# Patient Record
Sex: MALE | Race: BLACK OR AFRICAN AMERICAN | NOT HISPANIC OR LATINO | URBAN - METROPOLITAN AREA
[De-identification: names, ages, dates, MRNs, and addresses within clinical notes are randomized per-mention and may not be internally consistent; named-entity substitution may affect disease eponyms.]

---

## 2020-12-19 ENCOUNTER — EMERGENCY (EMERGENCY)
Facility: HOSPITAL | Age: 60
LOS: 1 days | Discharge: HOME | End: 2020-12-19
Admitting: EMERGENCY MEDICINE
Payer: COMMERCIAL

## 2020-12-19 VITALS
RESPIRATION RATE: 18 BRPM | OXYGEN SATURATION: 98 % | DIASTOLIC BLOOD PRESSURE: 82 MMHG | HEIGHT: 71 IN | HEART RATE: 81 BPM | TEMPERATURE: 99 F | SYSTOLIC BLOOD PRESSURE: 129 MMHG | WEIGHT: 182.98 LBS

## 2020-12-19 VITALS
DIASTOLIC BLOOD PRESSURE: 72 MMHG | HEART RATE: 76 BPM | TEMPERATURE: 98 F | OXYGEN SATURATION: 98 % | SYSTOLIC BLOOD PRESSURE: 134 MMHG | RESPIRATION RATE: 16 BRPM

## 2020-12-19 DIAGNOSIS — M79.604 PAIN IN RIGHT LEG: ICD-10-CM

## 2020-12-19 LAB
ALBUMIN SERPL ELPH-MCNC: 3.9 G/DL — SIGNIFICANT CHANGE UP (ref 3.4–5)
ALP SERPL-CCNC: 56 U/L — SIGNIFICANT CHANGE UP (ref 40–120)
ALT FLD-CCNC: 23 U/L — SIGNIFICANT CHANGE UP (ref 12–42)
ANION GAP SERPL CALC-SCNC: 8 MMOL/L — LOW (ref 9–16)
APTT BLD: 30.5 SEC — SIGNIFICANT CHANGE UP (ref 27.5–35.5)
AST SERPL-CCNC: 35 U/L — SIGNIFICANT CHANGE UP (ref 15–37)
BASOPHILS # BLD AUTO: 0.05 K/UL — SIGNIFICANT CHANGE UP (ref 0–0.2)
BASOPHILS NFR BLD AUTO: 1.1 % — SIGNIFICANT CHANGE UP (ref 0–2)
BILIRUB SERPL-MCNC: 0.6 MG/DL — SIGNIFICANT CHANGE UP (ref 0.2–1.2)
BUN SERPL-MCNC: 15 MG/DL — SIGNIFICANT CHANGE UP (ref 7–23)
CALCIUM SERPL-MCNC: 9.7 MG/DL — SIGNIFICANT CHANGE UP (ref 8.5–10.5)
CHLORIDE SERPL-SCNC: 106 MMOL/L — SIGNIFICANT CHANGE UP (ref 96–108)
CK SERPL-CCNC: 166 U/L — SIGNIFICANT CHANGE UP (ref 39–308)
CO2 SERPL-SCNC: 27 MMOL/L — SIGNIFICANT CHANGE UP (ref 22–31)
CREAT SERPL-MCNC: 1.14 MG/DL — SIGNIFICANT CHANGE UP (ref 0.5–1.3)
D DIMER BLD IA.RAPID-MCNC: 284 NG/ML DDU — HIGH
EOSINOPHIL # BLD AUTO: 0.43 K/UL — SIGNIFICANT CHANGE UP (ref 0–0.5)
EOSINOPHIL NFR BLD AUTO: 9.2 % — HIGH (ref 0–6)
GLUCOSE SERPL-MCNC: 114 MG/DL — HIGH (ref 70–99)
HCT VFR BLD CALC: 41.2 % — SIGNIFICANT CHANGE UP (ref 39–50)
HGB BLD-MCNC: 14.2 G/DL — SIGNIFICANT CHANGE UP (ref 13–17)
IMM GRANULOCYTES NFR BLD AUTO: 0.2 % — SIGNIFICANT CHANGE UP (ref 0–1.5)
INR BLD: 1.13 — SIGNIFICANT CHANGE UP (ref 0.88–1.16)
LYMPHOCYTES # BLD AUTO: 1.43 K/UL — SIGNIFICANT CHANGE UP (ref 1–3.3)
LYMPHOCYTES # BLD AUTO: 30.5 % — SIGNIFICANT CHANGE UP (ref 13–44)
MAGNESIUM SERPL-MCNC: 2 MG/DL — SIGNIFICANT CHANGE UP (ref 1.6–2.6)
MCHC RBC-ENTMCNC: 25.9 PG — LOW (ref 27–34)
MCHC RBC-ENTMCNC: 34.5 GM/DL — SIGNIFICANT CHANGE UP (ref 32–36)
MCV RBC AUTO: 75 FL — LOW (ref 80–100)
MONOCYTES # BLD AUTO: 0.5 K/UL — SIGNIFICANT CHANGE UP (ref 0–0.9)
MONOCYTES NFR BLD AUTO: 10.7 % — SIGNIFICANT CHANGE UP (ref 2–14)
NEUTROPHILS # BLD AUTO: 2.27 K/UL — SIGNIFICANT CHANGE UP (ref 1.8–7.4)
NEUTROPHILS NFR BLD AUTO: 48.3 % — SIGNIFICANT CHANGE UP (ref 43–77)
NRBC # BLD: 0 /100 WBCS — SIGNIFICANT CHANGE UP (ref 0–0)
PLATELET # BLD AUTO: 228 K/UL — SIGNIFICANT CHANGE UP (ref 150–400)
POTASSIUM SERPL-MCNC: 4.4 MMOL/L — SIGNIFICANT CHANGE UP (ref 3.5–5.3)
POTASSIUM SERPL-SCNC: 4.4 MMOL/L — SIGNIFICANT CHANGE UP (ref 3.5–5.3)
PROT SERPL-MCNC: 8.5 G/DL — HIGH (ref 6.4–8.2)
PROTHROM AB SERPL-ACNC: 13.3 SEC — SIGNIFICANT CHANGE UP (ref 10.6–13.6)
RBC # BLD: 5.49 M/UL — SIGNIFICANT CHANGE UP (ref 4.2–5.8)
RBC # FLD: 16.1 % — HIGH (ref 10.3–14.5)
SODIUM SERPL-SCNC: 141 MMOL/L — SIGNIFICANT CHANGE UP (ref 132–145)
WBC # BLD: 4.69 K/UL — SIGNIFICANT CHANGE UP (ref 3.8–10.5)
WBC # FLD AUTO: 4.69 K/UL — SIGNIFICANT CHANGE UP (ref 3.8–10.5)

## 2020-12-19 PROCEDURE — 73590 X-RAY EXAM OF LOWER LEG: CPT | Mod: 26,RT

## 2020-12-19 PROCEDURE — 93971 EXTREMITY STUDY: CPT | Mod: 26,RT

## 2020-12-19 PROCEDURE — 99053 MED SERV 10PM-8AM 24 HR FAC: CPT

## 2020-12-19 PROCEDURE — 99285 EMERGENCY DEPT VISIT HI MDM: CPT | Mod: 25

## 2020-12-19 RX ORDER — CYCLOBENZAPRINE HYDROCHLORIDE 10 MG/1
10 TABLET, FILM COATED ORAL ONCE
Refills: 0 | Status: COMPLETED | OUTPATIENT
Start: 2020-12-19 | End: 2020-12-19

## 2020-12-19 RX ORDER — TRAMADOL HYDROCHLORIDE 50 MG/1
1 TABLET ORAL
Qty: 5 | Refills: 0
Start: 2020-12-19

## 2020-12-19 RX ORDER — ACETAMINOPHEN WITH CODEINE 300MG-30MG
1 TABLET ORAL ONCE
Refills: 0 | Status: DISCONTINUED | OUTPATIENT
Start: 2020-12-19 | End: 2020-12-19

## 2020-12-19 RX ORDER — KETOROLAC TROMETHAMINE 30 MG/ML
15 SYRINGE (ML) INJECTION ONCE
Refills: 0 | Status: DISCONTINUED | OUTPATIENT
Start: 2020-12-19 | End: 2020-12-19

## 2020-12-19 RX ORDER — TRAMADOL HYDROCHLORIDE 50 MG/1
50 TABLET ORAL ONCE
Refills: 0 | Status: DISCONTINUED | OUTPATIENT
Start: 2020-12-19 | End: 2020-12-19

## 2020-12-19 RX ORDER — CYCLOBENZAPRINE HYDROCHLORIDE 10 MG/1
1 TABLET, FILM COATED ORAL
Qty: 15 | Refills: 0
Start: 2020-12-19 | End: 2020-12-23

## 2020-12-19 RX ADMIN — CYCLOBENZAPRINE HYDROCHLORIDE 10 MILLIGRAM(S): 10 TABLET, FILM COATED ORAL at 06:32

## 2020-12-19 RX ADMIN — Medication 1 TABLET(S): at 06:32

## 2020-12-19 RX ADMIN — TRAMADOL HYDROCHLORIDE 50 MILLIGRAM(S): 50 TABLET ORAL at 11:23

## 2020-12-19 RX ADMIN — Medication 15 MILLIGRAM(S): at 06:18

## 2020-12-19 NOTE — ED PROVIDER NOTE - PROGRESS NOTE DETAILS
SOCORRO Farah signed out patient to day shift pending US, re-evaluation and dispositions. labs reviewed. d-dimer age corrected normal. xray with no acute findings. reports improved pain after medication

## 2020-12-19 NOTE — ED PROVIDER NOTE - PHYSICAL EXAMINATION
VITAL SIGNS: I have reviewed nursing notes and confirm.  CONSTITUTIONAL: Well-developed; well-nourished; in no acute distress.  SKIN: Skin is warm and dry, no acute rash.  HEAD: Normocephalic; atraumatic.  EYES: PERRL, EOM intact; conjunctiva and sclera clear.  ENT: No nasal discharge; airway clear.  NECK: Supple; non tender.  CARD: S1, S2 normal; no murmurs, gallops, or rubs. Regular rate and rhythm.  RESP: No wheezes, rales or rhonchi.  ABD: Normal bowel sounds; soft; non-distended; non-tender;  no palpable or pulsating mass; no hepatosplenomegaly.  EXT: RLE: pinpoint TTP of distal tibia, no bruising/swelling/erythema seen, NVI, strength 5/5, no calf tenderness  OTHER: Normal ROM. No clubbing, cyanosis or edema.  NEURO: Alert, oriented. Grossly unremarkable.  PSYCH: Cooperative, appropriate.

## 2020-12-19 NOTE — ED PROVIDER NOTE - PROVIDER TOKENS
PROVIDER:[TOKEN:[[id number]],FOLLOWUP:[1-3 Days]],PROVIDER:[TOKEN:[[id number]],FOLLOWUP:[1-3 Days]]

## 2020-12-19 NOTE — ED PROVIDER NOTE - OBJECTIVE STATEMENT
60 year old male with h/o HIV (on meds, last VL undetectable in Oct) presents with pain to anterior right leg x 4 days. reports pain has been constant. no known injury or trauma. reports lives at home. has been trying to elevate and take motrin with minimal relief. states it feels tight. no recent travel  Denies head trauma, LOC, break in the skin, paresthesia, numbness, tingling, redness, bleeding, d/c, HA, dizziness, SOB, CP, palpitations, N/V, focal weakness, neck/back pain, and malaise.

## 2020-12-19 NOTE — ED PROVIDER NOTE - CARE PROVIDER_API CALL
Mireille Barreto ([place] [address]  Phone: [phone]  Fax: [phone]  Follow Up Time: 1-3 Days    Jose [place] [address]  Phone: [phone]  Fax: [phone]  Follow Up Time: 1-3 Days

## 2020-12-19 NOTE — ED PROVIDER NOTE - NSFOLLOWUPINSTRUCTIONS_ED_ALL_ED_FT
PLEASE FOLLOW-UP WITH YOUR PRIMARY CARE DOCTOR IN 1-3 DAYS FOR FURTHER EVALUATION.  PLEASE TAKE ALL PAPERWORK FROM TODAY'S VISIT TO YOUR PRIMARY DOCTOR.  IF YOU DO NOT HAVE A PRIMARY CARE DOCTOR PLEASE REFER TO ONE OF THE PRIMARY CARE DOCTORS' INFORMATION GIVEN ABOVE.  YOU MAY ALSO CALL [phone] AND ASK FOR MS. MAYCOL LUCIANO.  SHE CAN HELP YOU MAKE A FOLLOW-UP APPOINTMENT.  HER HOURS ARE 11AM-7PM MONDAY - FRIDAY.    PLEASE RETURN TO THE ER IMMEDIATELY OR CALL [phone] FOR ANY HIGH FEVER, CHEST PAIN, TROUBLE BREATHING, VOMITING, SEVERE PAIN, OR ANY OTHER CONCERNS

## 2020-12-19 NOTE — ED ADULT NURSE REASSESSMENT NOTE - NS ED NURSE REASSESS COMMENT FT1
Pt received from AZUCENA Diallo. Pt C.o 10/10 pain in right lower leg and requesting food. SOCORRO Goncalves notified, warm packs and food given to patient at this time. Pt pending US. Will continue to monitor.

## 2020-12-19 NOTE — ED ADULT NURSE NOTE - CHIEF COMPLAINT QUOTE
pt on disability, lives at home, 5 days ago had spontaneous right lower leg pain, denies injury, tried to elevate and use motrin to no relief, pt states "it feels tight", nil sob

## 2020-12-19 NOTE — ED ADULT NURSE NOTE - OBJECTIVE STATEMENT
Pt to ER c/o Rt lower leg pain x 5 days, describe as tightness, denies injury or trauma, no labored breathing, tried motrin and tylenol at home with no relief

## 2020-12-19 NOTE — ED PROVIDER NOTE - NS ED ROS FT
· CONSTITUTIONAL: no fever and no chills.  · CARDIOVASCULAR: normal rate and rhythm, no chest pain and no edema.  · RESPIRATORY: no chest pain, no cough, and no shortness of breath.  · GASTROINTESTINAL: no abdominal pain, no bloating, no constipation, no diarrhea, no nausea and no vomiting.  · MUSCULOSKELETAL: no back pain, +leg pain, no musculoskeletal pain, no neck pain, and no weakness.  · SKIN: no abrasions, no jaundice, no lesions, no pruritis, and no rashes.  · NEURO: no loss of consciousness, no gait abnormality, no headache, no sensory deficits, and no weakness.  · PSYCHIATRIC: no known mental health issues.

## 2020-12-19 NOTE — ED PROVIDER NOTE - CARE PROVIDERS DIRECT ADDRESSES
[email or web address]

## 2020-12-19 NOTE — ED PROVIDER NOTE - PATIENT PORTAL LINK FT
You can access the FollowMyHealth Patient Portal offered by [facility] by registering at the following website: [email or web address]. By joining [facility]’s FollowMyHealth portal, you will also be able to view your health information using other applications (apps) compatible with our system.

## 2021-04-12 ENCOUNTER — HOSPITAL ENCOUNTER (INPATIENT)
Dept: HOSPITAL 74 - YASAS | Age: 61
LOS: 14 days | Discharge: HOME | DRG: 772 | End: 2021-04-26
Attending: ALLERGY & IMMUNOLOGY | Admitting: ALLERGY & IMMUNOLOGY
Payer: COMMERCIAL

## 2021-04-12 VITALS — BODY MASS INDEX: 24.5 KG/M2

## 2021-04-12 DIAGNOSIS — Z91.5: ICD-10-CM

## 2021-04-12 DIAGNOSIS — F17.210: ICD-10-CM

## 2021-04-12 DIAGNOSIS — F14.20: Primary | ICD-10-CM

## 2021-04-12 DIAGNOSIS — I10: ICD-10-CM

## 2021-04-12 DIAGNOSIS — F10.20: ICD-10-CM

## 2021-04-12 DIAGNOSIS — F31.9: ICD-10-CM

## 2021-04-12 PROCEDURE — U0003 INFECTIOUS AGENT DETECTION BY NUCLEIC ACID (DNA OR RNA); SEVERE ACUTE RESPIRATORY SYNDROME CORONAVIRUS 2 (SARS-COV-2) (CORONAVIRUS DISEASE [COVID-19]), AMPLIFIED PROBE TECHNIQUE, MAKING USE OF HIGH THROUGHPUT TECHNOLOGIES AS DESCRIBED BY CMS-2020-01-R: HCPCS

## 2021-04-12 PROCEDURE — G0009 ADMIN PNEUMOCOCCAL VACCINE: HCPCS

## 2021-04-12 PROCEDURE — G0008 ADMIN INFLUENZA VIRUS VAC: HCPCS

## 2021-04-12 PROCEDURE — U0005 INFEC AGEN DETEC AMPLI PROBE: HCPCS

## 2021-04-12 PROCEDURE — C9803 HOPD COVID-19 SPEC COLLECT: HCPCS

## 2021-04-13 LAB
ALBUMIN SERPL-MCNC: 3.2 G/DL (ref 3.4–5)
ALP SERPL-CCNC: 56 U/L (ref 45–117)
ALT SERPL-CCNC: 24 U/L (ref 13–61)
ANION GAP SERPL CALC-SCNC: 3 MMOL/L (ref 8–16)
APPEARANCE UR: CLEAR
AST SERPL-CCNC: 40 U/L (ref 15–37)
BILIRUB SERPL-MCNC: 0.4 MG/DL (ref 0.2–1)
BILIRUB UR STRIP.AUTO-MCNC: NEGATIVE MG/DL
BUN SERPL-MCNC: 12.6 MG/DL (ref 7–18)
CALCIUM SERPL-MCNC: 9.5 MG/DL (ref 8.5–10.1)
CHLORIDE SERPL-SCNC: 108 MMOL/L (ref 98–107)
CO2 SERPL-SCNC: 29 MMOL/L (ref 21–32)
COLOR UR: YELLOW
CREAT SERPL-MCNC: 1.1 MG/DL (ref 0.55–1.3)
DEPRECATED RDW RBC AUTO: 14.4 % (ref 11.9–15.9)
GLUCOSE SERPL-MCNC: 77 MG/DL (ref 74–106)
HCT VFR BLD CALC: 42.2 % (ref 35.4–49)
HGB BLD-MCNC: 14.3 GM/DL (ref 11.7–16.9)
KETONES UR QL STRIP: NEGATIVE
LEUKOCYTE ESTERASE UR QL STRIP.AUTO: NEGATIVE
MCH RBC QN AUTO: 26.6 PG (ref 25.7–33.7)
MCHC RBC AUTO-ENTMCNC: 33.8 G/DL (ref 32–35.9)
MCV RBC: 78.7 FL (ref 80–96)
NITRITE UR QL STRIP: NEGATIVE
PH UR: 5.5 [PH] (ref 5–8)
PLATELET # BLD AUTO: 205 K/MM3 (ref 134–434)
PMV BLD: 9.1 FL (ref 7.5–11.1)
PROT SERPL-MCNC: 7.3 G/DL (ref 6.4–8.2)
PROT UR QL STRIP: NEGATIVE
PROT UR QL STRIP: NEGATIVE
RBC # BLD AUTO: 5.36 M/MM3 (ref 4–5.6)
SODIUM SERPL-SCNC: 140 MMOL/L (ref 136–145)
SP GR UR: 1.01 (ref 1.01–1.03)
UROBILINOGEN UR STRIP-MCNC: 0.2 MG/DL (ref 0.2–1)
WBC # BLD AUTO: 3.7 K/MM3 (ref 4–10)

## 2021-04-13 PROCEDURE — HZ42ZZZ GROUP COUNSELING FOR SUBSTANCE ABUSE TREATMENT, COGNITIVE-BEHAVIORAL: ICD-10-PCS | Performed by: ALLERGY & IMMUNOLOGY

## 2021-04-13 RX ADMIN — NICOTINE SCH MG: 7 PATCH TRANSDERMAL at 10:06

## 2021-04-13 RX ADMIN — Medication SCH MG: at 01:36

## 2021-04-13 RX ADMIN — Medication SCH MG: at 21:06

## 2021-04-13 RX ADMIN — TRAZODONE HYDROCHLORIDE SCH MG: 100 TABLET ORAL at 21:06

## 2021-04-13 RX ADMIN — LISINOPRIL SCH MG: 20 TABLET ORAL at 10:06

## 2021-04-13 RX ADMIN — Medication SCH TAB: at 10:06

## 2021-04-14 RX ADMIN — Medication SCH TAB: at 09:33

## 2021-04-14 RX ADMIN — TRAZODONE HYDROCHLORIDE SCH MG: 100 TABLET ORAL at 21:14

## 2021-04-14 RX ADMIN — Medication SCH MG: at 21:14

## 2021-04-14 RX ADMIN — LISINOPRIL SCH MG: 20 TABLET ORAL at 09:33

## 2021-04-14 RX ADMIN — NICOTINE SCH MG: 7 PATCH TRANSDERMAL at 09:33

## 2021-04-15 RX ADMIN — Medication SCH MG: at 21:09

## 2021-04-15 RX ADMIN — NICOTINE SCH MG: 7 PATCH TRANSDERMAL at 09:38

## 2021-04-15 RX ADMIN — LISINOPRIL SCH MG: 20 TABLET ORAL at 09:39

## 2021-04-15 RX ADMIN — TRAZODONE HYDROCHLORIDE SCH MG: 100 TABLET ORAL at 21:09

## 2021-04-15 RX ADMIN — Medication SCH TAB: at 09:39

## 2021-04-16 RX ADMIN — Medication SCH MG: at 21:03

## 2021-04-16 RX ADMIN — Medication SCH TAB: at 09:43

## 2021-04-16 RX ADMIN — TRAZODONE HYDROCHLORIDE SCH MG: 100 TABLET ORAL at 21:03

## 2021-04-16 RX ADMIN — NICOTINE SCH MG: 7 PATCH TRANSDERMAL at 09:43

## 2021-04-16 RX ADMIN — LISINOPRIL SCH MG: 20 TABLET ORAL at 09:43

## 2021-04-17 RX ADMIN — LISINOPRIL SCH MG: 20 TABLET ORAL at 09:22

## 2021-04-17 RX ADMIN — Medication SCH MG: at 21:07

## 2021-04-17 RX ADMIN — TRAZODONE HYDROCHLORIDE SCH MG: 100 TABLET ORAL at 21:07

## 2021-04-17 RX ADMIN — Medication SCH TAB: at 09:21

## 2021-04-17 RX ADMIN — NICOTINE SCH: 7 PATCH TRANSDERMAL at 09:21

## 2021-04-18 RX ADMIN — LISINOPRIL SCH MG: 20 TABLET ORAL at 09:14

## 2021-04-18 RX ADMIN — Medication SCH MG: at 21:09

## 2021-04-18 RX ADMIN — NICOTINE SCH: 7 PATCH TRANSDERMAL at 09:14

## 2021-04-18 RX ADMIN — TRAZODONE HYDROCHLORIDE SCH MG: 100 TABLET ORAL at 21:09

## 2021-04-18 RX ADMIN — Medication SCH TAB: at 09:14

## 2021-04-19 RX ADMIN — Medication SCH MG: at 21:11

## 2021-04-19 RX ADMIN — LISINOPRIL SCH MG: 20 TABLET ORAL at 10:03

## 2021-04-19 RX ADMIN — NICOTINE SCH: 7 PATCH TRANSDERMAL at 10:03

## 2021-04-19 RX ADMIN — TRAZODONE HYDROCHLORIDE SCH MG: 100 TABLET ORAL at 21:11

## 2021-04-19 RX ADMIN — Medication SCH TAB: at 10:03

## 2021-04-20 RX ADMIN — TRAZODONE HYDROCHLORIDE SCH MG: 100 TABLET ORAL at 21:11

## 2021-04-20 RX ADMIN — NICOTINE SCH: 7 PATCH TRANSDERMAL at 10:08

## 2021-04-20 RX ADMIN — LISINOPRIL SCH MG: 20 TABLET ORAL at 10:09

## 2021-04-20 RX ADMIN — Medication SCH MG: at 21:11

## 2021-04-20 RX ADMIN — Medication SCH TAB: at 10:08

## 2021-04-21 RX ADMIN — Medication SCH TAB: at 10:00

## 2021-04-21 RX ADMIN — LISINOPRIL SCH MG: 20 TABLET ORAL at 10:00

## 2021-04-21 RX ADMIN — Medication SCH MG: at 21:03

## 2021-04-21 RX ADMIN — NICOTINE SCH: 7 PATCH TRANSDERMAL at 10:00

## 2021-04-21 RX ADMIN — TRAZODONE HYDROCHLORIDE SCH MG: 100 TABLET ORAL at 21:03

## 2021-04-22 RX ADMIN — TRAZODONE HYDROCHLORIDE SCH MG: 100 TABLET ORAL at 21:02

## 2021-04-22 RX ADMIN — LISINOPRIL SCH MG: 20 TABLET ORAL at 09:51

## 2021-04-22 RX ADMIN — NICOTINE SCH: 7 PATCH TRANSDERMAL at 09:52

## 2021-04-22 RX ADMIN — Medication SCH MG: at 21:02

## 2021-04-22 RX ADMIN — Medication SCH TAB: at 09:51

## 2021-04-23 RX ADMIN — LISINOPRIL SCH MG: 20 TABLET ORAL at 09:41

## 2021-04-23 RX ADMIN — Medication SCH MG: at 21:11

## 2021-04-23 RX ADMIN — Medication SCH TAB: at 09:41

## 2021-04-23 RX ADMIN — TRAZODONE HYDROCHLORIDE SCH MG: 100 TABLET ORAL at 21:12

## 2021-04-23 RX ADMIN — NICOTINE SCH: 7 PATCH TRANSDERMAL at 09:41

## 2021-04-24 RX ADMIN — Medication SCH MG: at 21:07

## 2021-04-24 RX ADMIN — Medication SCH TAB: at 09:39

## 2021-04-24 RX ADMIN — NICOTINE SCH: 7 PATCH TRANSDERMAL at 09:40

## 2021-04-24 RX ADMIN — TRAZODONE HYDROCHLORIDE SCH MG: 100 TABLET ORAL at 21:07

## 2021-04-24 RX ADMIN — LISINOPRIL SCH MG: 20 TABLET ORAL at 09:40

## 2021-04-25 RX ADMIN — Medication SCH MG: at 21:19

## 2021-04-25 RX ADMIN — Medication SCH TAB: at 09:48

## 2021-04-25 RX ADMIN — NICOTINE SCH: 7 PATCH TRANSDERMAL at 09:48

## 2021-04-25 RX ADMIN — TRAZODONE HYDROCHLORIDE SCH MG: 100 TABLET ORAL at 21:19

## 2021-04-25 RX ADMIN — LISINOPRIL SCH MG: 20 TABLET ORAL at 09:48

## 2021-04-26 VITALS — SYSTOLIC BLOOD PRESSURE: 142 MMHG | DIASTOLIC BLOOD PRESSURE: 77 MMHG | HEART RATE: 87 BPM

## 2021-04-26 VITALS — TEMPERATURE: 97.1 F

## 2022-11-24 ENCOUNTER — INPATIENT (INPATIENT)
Facility: HOSPITAL | Age: 62
LOS: 5 days | Discharge: HOME | DRG: 41 | End: 2022-11-30
Attending: PSYCHIATRY & NEUROLOGY | Admitting: PSYCHIATRY & NEUROLOGY
Payer: MEDICAID

## 2022-11-24 VITALS
HEIGHT: 71 IN | SYSTOLIC BLOOD PRESSURE: 150 MMHG | TEMPERATURE: 98 F | OXYGEN SATURATION: 98 % | RESPIRATION RATE: 18 BRPM | WEIGHT: 169.98 LBS | HEART RATE: 83 BPM | DIASTOLIC BLOOD PRESSURE: 85 MMHG

## 2022-11-24 PROBLEM — B20 HUMAN IMMUNODEFICIENCY VIRUS [HIV] DISEASE: Chronic | Status: ACTIVE | Noted: 2020-12-19

## 2022-11-24 LAB
ALBUMIN SERPL ELPH-MCNC: 3.6 G/DL — SIGNIFICANT CHANGE UP (ref 3.4–5)
ALP SERPL-CCNC: 66 U/L — SIGNIFICANT CHANGE UP (ref 40–120)
ALT FLD-CCNC: 51 U/L — HIGH (ref 12–42)
AMPHET UR-MCNC: NEGATIVE — SIGNIFICANT CHANGE UP
ANION GAP SERPL CALC-SCNC: 7 MMOL/L — LOW (ref 9–16)
APPEARANCE UR: CLEAR — SIGNIFICANT CHANGE UP
APTT BLD: 34.7 SEC — SIGNIFICANT CHANGE UP (ref 27.5–35.5)
AST SERPL-CCNC: 49 U/L — HIGH (ref 15–37)
BARBITURATES UR SCN-MCNC: NEGATIVE — SIGNIFICANT CHANGE UP
BASOPHILS # BLD AUTO: 0.04 K/UL — SIGNIFICANT CHANGE UP (ref 0–0.2)
BASOPHILS NFR BLD AUTO: 1 % — SIGNIFICANT CHANGE UP (ref 0–2)
BENZODIAZ UR-MCNC: NEGATIVE — SIGNIFICANT CHANGE UP
BILIRUB SERPL-MCNC: 0.3 MG/DL — SIGNIFICANT CHANGE UP (ref 0.2–1.2)
BILIRUB UR-MCNC: NEGATIVE — SIGNIFICANT CHANGE UP
BUN SERPL-MCNC: 20 MG/DL — SIGNIFICANT CHANGE UP (ref 7–23)
CALCIUM SERPL-MCNC: 9.8 MG/DL — SIGNIFICANT CHANGE UP (ref 8.5–10.5)
CHLORIDE SERPL-SCNC: 103 MMOL/L — SIGNIFICANT CHANGE UP (ref 96–108)
CO2 SERPL-SCNC: 30 MMOL/L — SIGNIFICANT CHANGE UP (ref 22–31)
COCAINE METAB.OTHER UR-MCNC: POSITIVE
COLOR SPEC: YELLOW — SIGNIFICANT CHANGE UP
CREAT SERPL-MCNC: 1.13 MG/DL — SIGNIFICANT CHANGE UP (ref 0.5–1.3)
DIFF PNL FLD: NEGATIVE — SIGNIFICANT CHANGE UP
EGFR: 73 ML/MIN/1.73M2 — SIGNIFICANT CHANGE UP
EOSINOPHIL # BLD AUTO: 0.19 K/UL — SIGNIFICANT CHANGE UP (ref 0–0.5)
EOSINOPHIL NFR BLD AUTO: 5 % — SIGNIFICANT CHANGE UP (ref 0–6)
ETHANOL SERPL-MCNC: <3 MG/DL — SIGNIFICANT CHANGE UP
GLUCOSE SERPL-MCNC: 133 MG/DL — HIGH (ref 70–99)
GLUCOSE UR QL: NEGATIVE — SIGNIFICANT CHANGE UP
HCT VFR BLD CALC: 41.7 % — SIGNIFICANT CHANGE UP (ref 39–50)
HGB BLD-MCNC: 14.7 G/DL — SIGNIFICANT CHANGE UP (ref 13–17)
INR BLD: 1.08 — SIGNIFICANT CHANGE UP (ref 0.88–1.16)
KETONES UR-MCNC: NEGATIVE — SIGNIFICANT CHANGE UP
LACTATE SERPL-SCNC: 0.6 MMOL/L — SIGNIFICANT CHANGE UP (ref 0.4–2)
LACTATE SERPL-SCNC: 2.5 MMOL/L — HIGH (ref 0.4–2)
LEUKOCYTE ESTERASE UR-ACNC: NEGATIVE — SIGNIFICANT CHANGE UP
LG PLATELETS BLD QL AUTO: SLIGHT — SIGNIFICANT CHANGE UP
LYMPHOCYTES # BLD AUTO: 1.38 K/UL — SIGNIFICANT CHANGE UP (ref 1–3.3)
LYMPHOCYTES # BLD AUTO: 37 % — SIGNIFICANT CHANGE UP (ref 13–44)
MANUAL SMEAR VERIFICATION: SIGNIFICANT CHANGE UP
MCHC RBC-ENTMCNC: 26.7 PG — LOW (ref 27–34)
MCHC RBC-ENTMCNC: 35.3 GM/DL — SIGNIFICANT CHANGE UP (ref 32–36)
MCV RBC AUTO: 75.7 FL — LOW (ref 80–100)
METHADONE UR-MCNC: NEGATIVE — SIGNIFICANT CHANGE UP
MICROCYTES BLD QL: SLIGHT — SIGNIFICANT CHANGE UP
MONOCYTES # BLD AUTO: 0.11 K/UL — SIGNIFICANT CHANGE UP (ref 0–0.9)
MONOCYTES NFR BLD AUTO: 3 % — SIGNIFICANT CHANGE UP (ref 2–14)
NEUTROPHILS # BLD AUTO: 1.86 K/UL — SIGNIFICANT CHANGE UP (ref 1.8–7.4)
NEUTROPHILS NFR BLD AUTO: 50 % — SIGNIFICANT CHANGE UP (ref 43–77)
NITRITE UR-MCNC: NEGATIVE — SIGNIFICANT CHANGE UP
NRBC # BLD: 0 /100 — SIGNIFICANT CHANGE UP (ref 0–0)
NRBC # BLD: SIGNIFICANT CHANGE UP /100 WBCS (ref 0–0)
OPIATES UR-MCNC: NEGATIVE — SIGNIFICANT CHANGE UP
PCP SPEC-MCNC: SIGNIFICANT CHANGE UP
PCP UR-MCNC: NEGATIVE — SIGNIFICANT CHANGE UP
PH UR: 6.5 — SIGNIFICANT CHANGE UP (ref 5–8)
PLAT MORPH BLD: ABNORMAL
PLATELET # BLD AUTO: 233 K/UL — SIGNIFICANT CHANGE UP (ref 150–400)
POIKILOCYTOSIS BLD QL AUTO: SLIGHT — SIGNIFICANT CHANGE UP
POTASSIUM SERPL-MCNC: 4 MMOL/L — SIGNIFICANT CHANGE UP (ref 3.5–5.3)
POTASSIUM SERPL-SCNC: 4 MMOL/L — SIGNIFICANT CHANGE UP (ref 3.5–5.3)
PROT SERPL-MCNC: 9.1 G/DL — HIGH (ref 6.4–8.2)
PROT UR-MCNC: NEGATIVE MG/DL — SIGNIFICANT CHANGE UP
PROTHROM AB SERPL-ACNC: 12.7 SEC — SIGNIFICANT CHANGE UP (ref 10.5–13.4)
RBC # BLD: 5.51 M/UL — SIGNIFICANT CHANGE UP (ref 4.2–5.8)
RBC # FLD: 14.2 % — SIGNIFICANT CHANGE UP (ref 10.3–14.5)
RBC BLD AUTO: ABNORMAL
SARS-COV-2 RNA SPEC QL NAA+PROBE: SIGNIFICANT CHANGE UP
SODIUM SERPL-SCNC: 140 MMOL/L — SIGNIFICANT CHANGE UP (ref 132–145)
SP GR SPEC: 1.01 — SIGNIFICANT CHANGE UP (ref 1–1.03)
TARGETS BLD QL SMEAR: SLIGHT — SIGNIFICANT CHANGE UP
THC UR QL: NEGATIVE — SIGNIFICANT CHANGE UP
TROPONIN I, HIGH SENSITIVITY RESULT: 11 NG/L — SIGNIFICANT CHANGE UP
UROBILINOGEN FLD QL: 0.2 E.U./DL — SIGNIFICANT CHANGE UP
VARIANT LYMPHS # BLD: 4 % — SIGNIFICANT CHANGE UP (ref 0–6)
WBC # BLD: 3.72 K/UL — LOW (ref 3.8–10.5)
WBC # FLD AUTO: 3.72 K/UL — LOW (ref 3.8–10.5)

## 2022-11-24 PROCEDURE — 99285 EMERGENCY DEPT VISIT HI MDM: CPT

## 2022-11-24 PROCEDURE — 0042T: CPT

## 2022-11-24 PROCEDURE — 93010 ELECTROCARDIOGRAM REPORT: CPT

## 2022-11-24 PROCEDURE — 70496 CT ANGIOGRAPHY HEAD: CPT | Mod: 26

## 2022-11-24 PROCEDURE — 70498 CT ANGIOGRAPHY NECK: CPT | Mod: 26

## 2022-11-24 PROCEDURE — 71045 X-RAY EXAM CHEST 1 VIEW: CPT | Mod: 26

## 2022-11-24 RX ORDER — SODIUM CHLORIDE 9 MG/ML
1000 INJECTION INTRAMUSCULAR; INTRAVENOUS; SUBCUTANEOUS ONCE
Refills: 0 | Status: COMPLETED | OUTPATIENT
Start: 2022-11-24 | End: 2022-11-24

## 2022-11-24 RX ORDER — ATORVASTATIN CALCIUM 80 MG/1
80 TABLET, FILM COATED ORAL AT BEDTIME
Refills: 0 | Status: DISCONTINUED | OUTPATIENT
Start: 2022-11-24 | End: 2022-11-28

## 2022-11-24 RX ORDER — ASPIRIN/CALCIUM CARB/MAGNESIUM 324 MG
324 TABLET ORAL ONCE
Refills: 0 | Status: COMPLETED | OUTPATIENT
Start: 2022-11-24 | End: 2022-11-24

## 2022-11-24 RX ORDER — ENOXAPARIN SODIUM 100 MG/ML
40 INJECTION SUBCUTANEOUS EVERY 24 HOURS
Refills: 0 | Status: DISCONTINUED | OUTPATIENT
Start: 2022-11-24 | End: 2022-11-30

## 2022-11-24 RX ORDER — ASPIRIN/CALCIUM CARB/MAGNESIUM 324 MG
81 TABLET ORAL DAILY
Refills: 0 | Status: DISCONTINUED | OUTPATIENT
Start: 2022-11-25 | End: 2022-11-30

## 2022-11-24 RX ORDER — CLOPIDOGREL BISULFATE 75 MG/1
75 TABLET, FILM COATED ORAL DAILY
Refills: 0 | Status: DISCONTINUED | OUTPATIENT
Start: 2022-11-25 | End: 2022-11-30

## 2022-11-24 RX ORDER — CLOPIDOGREL BISULFATE 75 MG/1
300 TABLET, FILM COATED ORAL ONCE
Refills: 0 | Status: COMPLETED | OUTPATIENT
Start: 2022-11-24 | End: 2022-11-24

## 2022-11-24 RX ADMIN — SODIUM CHLORIDE 1000 MILLILITER(S): 9 INJECTION INTRAMUSCULAR; INTRAVENOUS; SUBCUTANEOUS at 16:26

## 2022-11-24 RX ADMIN — CLOPIDOGREL BISULFATE 300 MILLIGRAM(S): 75 TABLET, FILM COATED ORAL at 16:26

## 2022-11-24 RX ADMIN — Medication 324 MILLIGRAM(S): at 16:26

## 2022-11-24 RX ADMIN — ENOXAPARIN SODIUM 40 MILLIGRAM(S): 100 INJECTION SUBCUTANEOUS at 19:23

## 2022-11-24 RX ADMIN — ATORVASTATIN CALCIUM 80 MILLIGRAM(S): 80 TABLET, FILM COATED ORAL at 22:06

## 2022-11-24 NOTE — H&P ADULT - ASSESSMENT
62y Male with PMHx of HIV, active cocaine use, presenting after waking up at 0800 with right arm and leg weakness. He presented to [facility] ED, stroke code initiated, NIHSS 3. NCHCT negative for trancortical infarction or hemorrhage. CTA head and neck negative for steno-occlusive disease. CT perfusion scan demonstrated an elevated Tmax in left temporal lobe concerning for ischemia. Patient now transferred to [facility] for further evaluation and management.     Neuro  #CVA workup  - s/p ASA 325mg and Plavix 300mg PO load at [facility]  - continue aspirin 81mg and plavix 75mg daily  - continue atorvastatin 80mg daily  - q4hr stroke neuro checks and vitals  - obtain MRI Brain without contrast  - Stroke Code HCT Results: negative  - Stroke Code CTA Results: negative for steno-occlusive disease  - Stroke education    Cards  #HTN  - permissive hypertension, Goal -180  - obtain TTE with bubble  - Stroke Code EKG Results:    #HLD  - high dose statin as above in CVA  - LDL results: pending    Pulm  - call provider if SPO2 < 94%    GI  #Nutrition/Fluids/Electrolytes   - replete K<4 and Mg <2  - Diet: DASH    Renal  - daily BMP    Infectious Disease  - Stroke Code CXR results:     Heme  #HIV  - pending AM viral load    Endocrine    - A1C results: pending      - TSH results: pending    DVT Prophylaxis  - lovenox sq for DVT prophylaxis   - SCDs for DVT prophylaxis     Dispo: pending PT/OT eval     Discussed daily hospital plans and goals with patient.     Discussed with Neurology Attending Dr. Myriam Segal 62y Male with PMHx of HIV, active cocaine use, presenting after waking up at 0800 with right arm and leg weakness. He presented to [facility] ED, stroke code initiated, NIHSS 3. NCHCT negative for trancortical infarction or hemorrhage. CTA head and neck negative for steno-occlusive disease. CT perfusion scan demonstrated an elevated Tmax in left temporal lobe concerning for ischemia. Patient now transferred to [facility] for further evaluation and management.     Neuro  #CVA workup  - s/p ASA 325mg and Plavix 300mg PO load at [facility]  - continue aspirin 81mg and plavix 75mg daily  - continue atorvastatin 80mg daily  - q4hr stroke neuro checks and vitals  - obtain MRI Brain without contrast  - Stroke Code HCT Results: negative  - Stroke Code CTA Results: negative for steno-occlusive disease  - Stroke education    Cards  #HTN  - permissive hypertension, Goal -180  - obtain TTE with bubble  - Stroke Code EKG Results: F/U official read.    #HLD  - high dose statin as above in CVA  - LDL results: pending    Pulm  - call provider if SPO2 < 94%    GI  #Nutrition/Fluids/Electrolytes   - replete K<4 and Mg <2  - Diet: DASH    Renal  - daily BMP    Infectious Disease  - Stroke Code CXR results: F/U official read.    Heme  #HIV  - pending AM viral load    Endocrine    - A1C results: pending      - TSH results: pending    DVT Prophylaxis  - lovenox sq for DVT prophylaxis   - SCDs for DVT prophylaxis     Dispo: pending PT/OT eval     Discussed daily hospital plans and goals with patient.     Discussed with Neurology Attending Dr. Myriam Segal

## 2022-11-24 NOTE — H&P ADULT - HISTORY OF PRESENT ILLNESS
**STROKE HPI***    HPI: 62y Male with PMHx of     PAST MEDICAL & SURGICAL HISTORY:  HIV (human immunodeficiency virus infection)          FAMILY HISTORY:      SOCIAL HISTORY:   Patient lives with *** at ***.   Smoking status:  Drinking:  Drug Use:     ROS: ***  Constitutional: No fever, weight loss or fatigue  Eyes: No eye pain, visual disturbances, or discharge  ENMT:  No difficulty hearing, tinnitus, vertigo; No sinus or throat pain  Neck: No pain or stiffness  Respiratory: No cough, wheezing, chills or hemoptysis  Cardiovascular: No chest pain, palpitations, shortness of breath, dizziness or leg swelling  Gastrointestinal: No abdominal pain. No nausea, vomiting or hematemesis; No diarrhea or constipation. Nohematochezia.  Genitourinary: No dysuria, frequency, hematuria or incontinence  Neurological: As per HPI  Skin: No itching, burning, rashes or lesions   Endocrine: No heat or cold intolerance; No hair loss  Musculoskeletal: No joint pain or swelling; No muscle, back or extremity pain  Psychiatric: No depression, anxiety, mood swings or difficulty sleeping  Heme/Lymph: No easy bruising or bleeding gums    T(C): 36.7 (22 @ 17:09), Max: 36.7 (22 @ 17:09)  HR: 64 (22 @ 17:57) (63 - 83)  BP: 160/109 (22 @ 17:57) (142/82 - 160/109)  RR: 18 (22 @ 17:57) (16 - 18)  SpO2: 98% (22 @ 17:57) (97% - 99%)    MEDICATION RECONCILIATION   MEDICATIONS  (STANDING):  atorvastatin 80 milliGRAM(s) Oral at bedtime  enoxaparin Injectable 40 milliGRAM(s) SubCutaneous every 24 hours    MEDICATIONS  (PRN):    Allergies    No Known Allergies    Intolerances      Vital Signs Last 24 Hrs  T(C): 36.7 (2022 17:09), Max: 36.7 (2022 17:09)  T(F): 98 (2022 17:09), Max: 98 (2022 17:09)  HR: 64 (2022 17:57) (63 - 83)  BP: 160/109 (2022 17:57) (142/82 - 160/109)  BP(mean): 128 (2022 17:57) (128 - 128)  RR: 18 (2022 17:57) (16 - 18)  SpO2: 98% (2022 17:57) (97% - 99%)    Parameters below as of 2022 17:57  Patient On (Oxygen Delivery Method): room air        Physical exam:  General: No acute distress, awake and alert  Cardiovascular: Regular rate and rhythm, no murmurs, rubs, or gallops. No bruits  Pulmonary: Anterior breath sounds clear bilaterally, no crackles or wheezing. No use of accessory muscles  GI: Abdomen soft, non-tender, non-distended    Neurologic:  -Mental status: Awake, alert, oriented to person, place, and time. Speech is fluent with intact naming, repetition, and comprehension, no dysarthria. Recent and remote memory intact. Follows commands. Attention/concentration intact. Fund of knowledge appropriate.  -Cranial nerves:   II: Visual fields are full to confrontation.  III, IV, VI: Extraocular movements are intact without nystagmus. Pupils equally round and reactive to light  V:  Facial sensation V1-V3 equal and intact   VII: Face is symmetric with normal eye closure and smile  VIII: Hearing is bilaterally intact to finger rub  IX, X: Uvula is midline and soft palate rises symmetrically  XI: Head turning and shoulder shrug are intact.  XII: Tongue protrudes midline  Motor: Normal bulk and tone. No pronator drift. Strength bilateral upper extremity 5/5, bilateral lower extremities 5/5.  Rapid alternating movements intact and symmetric  Sensation: Intact to light touch bilaterally. No neglect or extinction on double simultaneous testing.  Coordination: No dysmetria on finger-to-nose and heel-to-shin bilaterally  Reflexes: Downgoing toes bilaterally   Gait: Narrow gait and steady    NIHSS: **** ASPECT Score: *** ICH Score: *** (GCS)    Fingerstick Blood Glucose: CAPILLARY BLOOD GLUCOSE      POCT Blood Glucose.: 130 mg/dL (2022 14:23)    LABS:                        14.7   3.72  )-----------( 233      ( 2022 14:21 )             41.7     11-24    140  |  103  |  20  ----------------------------<  133<H>  4.0   |  30  |  1.13    Ca    9.8      2022 14:21    TPro  9.1<H>  /  Alb  3.6  /  TBili  0.3  /  DBili  x   /  AST  49<H>  /  ALT  51<H>  /  AlkPhos  66  11-24    PT/INR - ( 2022 14:21 )   PT: 12.7 sec;   INR: 1.08          PTT - ( 2022 14:21 )  PTT:34.7 sec      Urinalysis Basic - ( 2022 14:22 )    Color: Yellow / Appearance: Clear / S.010 / pH: x  Gluc: x / Ketone: NEGATIVE  / Bili: NEGATIVE / Urobili: 0.2 E.U./dL   Blood: x / Protein: NEGATIVE mg/dL / Nitrite: NEGATIVE   Leuk Esterase: NEGATIVE / RBC: x / WBC x   Sq Epi: x / Non Sq Epi: x / Bacteria: x        RADIOLOGY & ADDITIONAL STUDIES:    HCT:     CTA:    -----------------------------------------------------------------------------------------    ASSESSMENT/PLAN:    62y Male w/ PMH ***. HCT showed ***. CTA showed ***. Given *** tPA was ***. Patient was admitted to **** for further workup    **STROKE HPI***    HPI: 62y Male with PMHx of HIV, cocaine use, presenting after waking up at 0800 with right arm and leg weakness.    PAST MEDICAL & SURGICAL HISTORY:  HIV (human immunodeficiency virus infection)          FAMILY HISTORY:      SOCIAL HISTORY:   Patient lives with *** at ***.   Smoking status:  Drinking:  Drug Use:     ROS: ***  Constitutional: No fever, weight loss or fatigue  Eyes: No eye pain, visual disturbances, or discharge  ENMT:  No difficulty hearing, tinnitus, vertigo; No sinus or throat pain  Neck: No pain or stiffness  Respiratory: No cough, wheezing, chills or hemoptysis  Cardiovascular: No chest pain, palpitations, shortness of breath, dizziness or leg swelling  Gastrointestinal: No abdominal pain. No nausea, vomiting or hematemesis; No diarrhea or constipation. Nohematochezia.  Genitourinary: No dysuria, frequency, hematuria or incontinence  Neurological: As per HPI  Skin: No itching, burning, rashes or lesions   Endocrine: No heat or cold intolerance; No hair loss  Musculoskeletal: No joint pain or swelling; No muscle, back or extremity pain  Psychiatric: No depression, anxiety, mood swings or difficulty sleeping  Heme/Lymph: No easy bruising or bleeding gums    T(C): 36.7 (22 @ 17:09), Max: 36.7 (22 @ 17:09)  HR: 64 (22 @ 17:57) (63 - 83)  BP: 160/109 (22 @ 17:57) (142/82 - 160/109)  RR: 18 (22 @ 17:57) (16 - 18)  SpO2: 98% (22 @ 17:57) (97% - 99%)    MEDICATION RECONCILIATION   MEDICATIONS  (STANDING):  atorvastatin 80 milliGRAM(s) Oral at bedtime  enoxaparin Injectable 40 milliGRAM(s) SubCutaneous every 24 hours    MEDICATIONS  (PRN):    Allergies    No Known Allergies    Intolerances      Vital Signs Last 24 Hrs  T(C): 36.7 (2022 17:09), Max: 36.7 (2022 17:09)  T(F): 98 (2022 17:09), Max: 98 (2022 17:09)  HR: 64 (2022 17:57) (63 - 83)  BP: 160/109 (2022 17:57) (142/82 - 160/109)  BP(mean): 128 (2022 17:57) (128 - 128)  RR: 18 (2022 17:57) (16 - 18)  SpO2: 98% (2022 17:57) (97% - 99%)    Parameters below as of 2022 17:57  Patient On (Oxygen Delivery Method): room air        Physical exam:  General: No acute distress, awake and alert  Cardiovascular: Regular rate and rhythm, no murmurs, rubs, or gallops. No bruits  Pulmonary: Anterior breath sounds clear bilaterally, no crackles or wheezing. No use of accessory muscles  GI: Abdomen soft, non-tender, non-distended    Neurologic:  -Mental status: Awake, alert, oriented to person, place, and time. Speech is fluent with intact naming, repetition, and comprehension, no dysarthria. Recent and remote memory intact. Follows commands. Attention/concentration intact. Fund of knowledge appropriate.  -Cranial nerves:   II: Visual fields are full to confrontation.  III, IV, VI: Extraocular movements are intact without nystagmus. Pupils equally round and reactive to light  V:  Facial sensation V1-V3 equal and intact   VII: Face is symmetric with normal eye closure and smile  VIII: Hearing is bilaterally intact to finger rub  IX, X: Uvula is midline and soft palate rises symmetrically  XI: Head turning and shoulder shrug are intact.  XII: Tongue protrudes midline  Motor: Normal bulk and tone. No pronator drift. Strength bilateral upper extremity 5/5, bilateral lower extremities 5/5.  Rapid alternating movements intact and symmetric  Sensation: Intact to light touch bilaterally. No neglect or extinction on double simultaneous testing.  Coordination: No dysmetria on finger-to-nose and heel-to-shin bilaterally  Reflexes: Downgoing toes bilaterally   Gait: Narrow gait and steady    NIHSS: **** ASPECT Score: *** ICH Score: *** (GCS)    Fingerstick Blood Glucose: CAPILLARY BLOOD GLUCOSE      POCT Blood Glucose.: 130 mg/dL (2022 14:23)    LABS:                        14.7   3.72  )-----------( 233      ( 2022 14:21 )             41.7     11-24    140  |  103  |  20  ----------------------------<  133<H>  4.0   |  30  |  1.13    Ca    9.8      2022 14:21    TPro  9.1<H>  /  Alb  3.6  /  TBili  0.3  /  DBili  x   /  AST  49<H>  /  ALT  51<H>  /  AlkPhos  66      PT/INR - ( 2022 14:21 )   PT: 12.7 sec;   INR: 1.08          PTT - ( 2022 14:21 )  PTT:34.7 sec      Urinalysis Basic - ( 2022 14:22 )    Color: Yellow / Appearance: Clear / S.010 / pH: x  Gluc: x / Ketone: NEGATIVE  / Bili: NEGATIVE / Urobili: 0.2 E.U./dL   Blood: x / Protein: NEGATIVE mg/dL / Nitrite: NEGATIVE   Leuk Esterase: NEGATIVE / RBC: x / WBC x   Sq Epi: x / Non Sq Epi: x / Bacteria: x        RADIOLOGY & ADDITIONAL STUDIES:    < from: CT Brain Stroke Protocol (22 @ 14:32) >  IMPRESSION:    CT HEAD: No acute intracranial findings.    The study was performed at 2:32 PM 2022 and Dr. Araujo discussed the   above findings with Dr. Ja DEJESUS at 2:40 PM 2022.    CT PERFUSION: Suggestion of prolonged Tmax in the left temporal lobe   (Tmax >6 seconds: 6 mL) with normal CBF, findings consistent with   mismatch perfusion defect and suggestive of possible ischemia. MRI brain   may be obtained for further assessment.    CT ANGIOGRAPHY NECK: No vessel narrowing or occlusion in the neck.    CT ANGIOGRAPHY HEAD: No evidence of vascular stenosis, occlusion,   aneurysm or vascular malformation.    < end of copied text >      -----------------------------------------------------------------------------------------    ASSESSMENT/PLAN:    62y Male w/ PMH ***. HCT showed ***. CTA showed ***. Given *** tPA was ***. Patient was admitted to **** for further workup    STROKE HPI    HPI: 62y Male with PMHx of HIV, HTN, Polysubstance use(Heroine, cocaine use years ago), but admits to recent cocaine use last  (), presented to [facility] after waking up at 0800 with right arm and leg weakness.    PAST MEDICAL & SURGICAL HISTORY:  HIV (human immunodeficiency virus infection)          FAMILY HISTORY:      SOCIAL HISTORY:   Patient lives with *** at ***.   Smoking status:  Drinking:  Drug Use:     ROS: ***  Constitutional: No fever, weight loss or fatigue  Eyes: No eye pain, visual disturbances, or discharge  ENMT:  No difficulty hearing, tinnitus, vertigo; No sinus or throat pain  Neck: No pain or stiffness  Respiratory: No cough, wheezing, chills or hemoptysis  Cardiovascular: No chest pain, palpitations, shortness of breath, dizziness or leg swelling  Gastrointestinal: No abdominal pain. No nausea, vomiting or hematemesis; No diarrhea or constipation. Nohematochezia.  Genitourinary: No dysuria, frequency, hematuria or incontinence  Neurological: As per HPI  Skin: No itching, burning, rashes or lesions   Endocrine: No heat or cold intolerance; No hair loss  Musculoskeletal: No joint pain or swelling; No muscle, back or extremity pain  Psychiatric: No depression, anxiety, mood swings or difficulty sleeping  Heme/Lymph: No easy bruising or bleeding gums    T(C): 36.7 (22 @ 17:09), Max: 36.7 (22 @ 17:09)  HR: 64 (22 @ 17:57) (63 - 83)  BP: 160/109 (22 @ 17:57) (142/82 - 160/109)  RR: 18 (22 @ 17:57) (16 - 18)  SpO2: 98% (22 @ 17:57) (97% - 99%)    MEDICATION RECONCILIATION   MEDICATIONS  (STANDING):  atorvastatin 80 milliGRAM(s) Oral at bedtime  enoxaparin Injectable 40 milliGRAM(s) SubCutaneous every 24 hours    MEDICATIONS  (PRN):    Allergies    No Known Allergies    Intolerances      Vital Signs Last 24 Hrs  T(C): 36.7 (2022 17:09), Max: 36.7 (2022 17:09)  T(F): 98 (2022 17:09), Max: 98 (2022 17:09)  HR: 64 (2022 17:57) (63 - 83)  BP: 160/109 (2022 17:57) (142/82 - 160/109)  BP(mean): 128 (2022 17:57) (128 - 128)  RR: 18 (2022 17:57) (16 - 18)  SpO2: 98% (2022 17:57) (97% - 99%)    Parameters below as of 2022 17:57  Patient On (Oxygen Delivery Method): room air        Physical exam:  General: No acute distress, awake and alert  Cardiovascular: Regular rate and rhythm, no murmurs, rubs, or gallops. No bruits  Pulmonary: Anterior breath sounds clear bilaterally, no crackles or wheezing. No use of accessory muscles  GI: Abdomen soft, non-tender, non-distended    Neurologic:  -Mental status: Awake, alert, oriented to person, place, and time. Speech is fluent with intact naming, repetition, and comprehension, no dysarthria. Recent and remote memory intact. Follows commands. Attention/concentration intact. Fund of knowledge appropriate.  -Cranial nerves:   II: Visual fields are full to confrontation.  III, IV, VI: Extraocular movements are intact without nystagmus. Pupils equally round and reactive to light  V:  Facial sensation V1-V3 equal and intact   VII: Face is symmetric with normal eye closure and smile  VIII: Hearing is bilaterally intact to finger rub  IX, X: Uvula is midline and soft palate rises symmetrically  XI: Head turning and shoulder shrug are intact.  XII: Tongue protrudes midline  Motor: Normal bulk and tone. No pronator drift. Strength bilateral upper extremity 5/5, bilateral lower extremities 5/5.  Rapid alternating movements intact and symmetric  Sensation: Intact to light touch bilaterally. No neglect or extinction on double simultaneous testing.  Coordination: No dysmetria on finger-to-nose and heel-to-shin bilaterally  Reflexes: Downgoing toes bilaterally   Gait: Narrow gait and steady    NIHSS: **** ASPECT Score: *** ICH Score: *** (GCS)    Fingerstick Blood Glucose: CAPILLARY BLOOD GLUCOSE      POCT Blood Glucose.: 130 mg/dL (2022 14:23)    LABS:                        14.7   3.72  )-----------( 233      ( 2022 14:21 )             41.7         140  |  103  |  20  ----------------------------<  133<H>  4.0   |  30  |  1.13    Ca    9.8      2022 14:21    TPro  9.1<H>  /  Alb  3.6  /  TBili  0.3  /  DBili  x   /  AST  49<H>  /  ALT  51<H>  /  AlkPhos  66      PT/INR - ( 2022 14:21 )   PT: 12.7 sec;   INR: 1.08          PTT - ( 2022 14:21 )  PTT:34.7 sec      Urinalysis Basic - ( 2022 14:22 )    Color: Yellow / Appearance: Clear / S.010 / pH: x  Gluc: x / Ketone: NEGATIVE  / Bili: NEGATIVE / Urobili: 0.2 E.U./dL   Blood: x / Protein: NEGATIVE mg/dL / Nitrite: NEGATIVE   Leuk Esterase: NEGATIVE / RBC: x / WBC x   Sq Epi: x / Non Sq Epi: x / Bacteria: x        RADIOLOGY & ADDITIONAL STUDIES:    < from: CT Brain Stroke Protocol (22 @ 14:32) >  IMPRESSION:    CT HEAD: No acute intracranial findings.    The study was performed at 2:32 PM 2022 and Dr. Araujo discussed the   above findings with Dr. Ja DEJESUS at 2:40 PM 2022.    CT PERFUSION: Suggestion of prolonged Tmax in the left temporal lobe   (Tmax >6 seconds: 6 mL) with normal CBF, findings consistent with   mismatch perfusion defect and suggestive of possible ischemia. MRI brain   may be obtained for further assessment.    CT ANGIOGRAPHY NECK: No vessel narrowing or occlusion in the neck.    CT ANGIOGRAPHY HEAD: No evidence of vascular stenosis, occlusion,   aneurysm or vascular malformation.    < end of copied text >      -----------------------------------------------------------------------------------------    ASSESSMENT/PLAN:    62y Male w/ PMH ***. HCT showed ***. CTA showed ***. Given *** tPA was ***. Patient was admitted to **** for further workup    STROKE HPI    HPI: 62y Male with PMHx of HIV, HTN, Polysubstance use(Heroine, cocaine use years ago), but admits to recent cocaine use last sunday (11/20), presented to [facility] after waking up on 11/24 at 0800 with R sided weakness, unsteady gait and stuttering speech. Per pt [place] [facility] the next day (11/24) 8am and felt he cannot move the right side of his body, was lying in bed approximately for 2-3hrs and around 11am was able to move his RUE/RLE a bit better than before,  but still weak, called EMS and was brought to [facility] ER. CTH with no acute intracranial pathology, CTA H/N with no LVO/HGS and CTP with prolonged Tmax in L temporal lobe. Case D/W Dr. Segal, pt not a candidate for any acute intervention.                                                          -----------------------------------------------------------------------------------------    ASSESSMENT/PLAN:    62y Male w/ PMH ***. HCT showed ***. CTA showed ***. Given *** tPA was ***. Patient was admitted to **** for further workup

## 2022-11-24 NOTE — H&P ADULT - NSHPREVIEWOFSYSTEMS_GEN_ALL_CORE
ROS:   Constitutional: No fever, weight loss or fatigue  Eyes: No eye pain, visual disturbances, or discharge  ENMT:  No difficulty hearing, tinnitus, vertigo; No sinus or throat pain  Neck: No pain or stiffness  Respiratory: No cough, wheezing, chills or hemoptysis  Cardiovascular: No chest pain, palpitations, shortness of breath, dizziness or leg swelling  Gastrointestinal: No abdominal pain. No nausea, vomiting or hematemesis; No diarrhea or constipation.   Genitourinary: No dysuria, frequency, hematuria or incontinence  Neurological: As per HPI

## 2022-11-24 NOTE — ED ADULT NURSE NOTE - OBJECTIVE STATEMENT
R sided arm/leg weakness. Pt states he woke at 8am and felt his "whole right side was dead" , states feeling came back but went  "dead" again and called an ambulance. Stutter noted, pt states when he is anxious and "talks fast and yells with his girlfriend" he stutters. Stroke code activated in triage, protocol followed.

## 2022-11-24 NOTE — ED ADULT TRIAGE NOTE - CHIEF COMPLAINT QUOTE
BIBA c/o R sided weakness starting around 8a, when he woke up. h/o HIV and HTN.  in the field. BIBA c/o R sided weakness starting around 8a, when he woke up. h/o HIV and HTN.  in the field. CODE STROKE activated in triage BIBA c/o R sided arm/leg weakness starting around 8a with speech disturbance, when he woke up. h/o HIV and HTN.  in the field. CODE STROKE activated in triage BIBA c/o R sided arm/leg weakness with speech disturbance starting around 8a, when he woke up. h/o HIV and HTN.  in the field. CODE STROKE activated in triage

## 2022-11-24 NOTE — H&P ADULT - NSHPPHYSICALEXAM_GEN_ALL_CORE
Physical exam:  General: No acute distress, awake and alert  Cardiovascular: Regular rate and rhythm, no murmurs, rubs, or gallops. No bruits  Pulmonary: Anterior breath sounds clear bilaterally, no crackles or wheezing. No use of accessory muscles  GI: Abdomen soft, non-tender, non-distended    Neurologic:  -Mental status: Awake, alert, oriented to person, place, and time. Speech is fluent with intact naming, repetition, and comprehension, Slurred speech and intermittent stuttering+(pt not have his dentures with him). Recent and remote memory intact. Follows commands. Attention/concentration intact. Fund of knowledge appropriate.  -Cranial nerves:   II: Visual fields are full to confrontation.  III, IV, VI: Extraocular movements are intact without nystagmus. Pupils equally round and reactive to light  V: Intact V1-V3  VII: ? facial asymmetry 2/2 no dentures.  VIII: Hearing is bilaterally intact to finger rub  IX, X: Uvula is midline and soft palate rises symmetrically  XI: Head turning and shoulder shrug are intact.  XII: Tongue protrudes midline  Motor: Normal bulk and tone. No pronator drift. LUE/LLE : 5/5, RUE : Antigravity, able to lift off the bed and hold it for count of 10, does not hit the bed, wavering +, decreased  strength, Thou raises it much lower than LUE and RLE : Antigravity, does not hit the bed, wavering +.  Sensation: Intact to light touch bilaterally. No neglect or extinction on double simultaneous testing.  Coordination: No dysmetria on finger-to-nose out of proportion to weakness.  Reflexes: Downgoing toes bilaterally   Gait: Deferred.    NIHSS: 4

## 2022-11-24 NOTE — PATIENT PROFILE ADULT - FALL HARM RISK - HARM RISK INTERVENTIONS

## 2022-11-24 NOTE — ED ADULT NURSE NOTE - CHIEF COMPLAINT QUOTE
BIBA c/o R sided arm/leg weakness with speech disturbance starting around 8a, when he woke up. h/o HIV and HTN.  in the field. CODE STROKE activated in triage.

## 2022-11-24 NOTE — ED PROVIDER NOTE - OBJECTIVE STATEMENT
Triage Chief complaint: weakness   Nurse triage notes: BIBA c/o R sided arm/leg weakness starting around 8a with speech disturbance, when he woke up. h/o HIV and HTN.  in the field. CODE STROKE activated in triage.   Individuals Present: Dr. Peres (ED Attending), Matt Otoole (Scribe), Pt  Vital Signs: HR: 83, BP: 150/85, Respiratory Rate in minutes: 18, Temp(F): 97.9, SpO2: 98%   _____________________________________________________ Triage Chief complaint: weakness   Nurse triage notes: BIBA c/o R sided arm/leg weakness starting around 8a with speech disturbance, when he woke up. h/o HIV and HTN.  in the field. CODE STROKE activated in triage.   Individuals Present: Dr. Peres (ED Attending), Matt Otoole (Scribe), Pt  Vital Signs: HR: 83, BP: 150/85, Respiratory Rate in minutes: 18, Temp(F): 97.9, SpO2: 98%   _____________________________________________________  61 y/o M with PMH of HIV, hypertension, chronic R shoulder pain 2/2 rotator cuff injury, childhood history stuttering, presents to the ED for weakness. Pt reports he went to sleep at 10PM last night in his baseline state of health. He woke up this morning at 8AM with RUE and RLE weakness that made it difficult for him to get out of bed. He also notes he was stuttering more than baseline. Pt states the weakness improved as the day progressed but did not completely resolve. He denies fevers, chills, CP, SOB, N/V, visual changes, facial droop, sensation loss, HA, or back pains.

## 2022-11-24 NOTE — PATIENT PROFILE ADULT - ...
Physical Therapy Daily Treatment     Visit Count: 7  Plan of Care Dates: Initial: 4/9/2018 Through: 6/4/2018  Insurance Information: Authorization required: pending authorization    Next Referring Provider Visit: 4/19/18  Date of Surgery: 4/5/18; Surgery performed: R TKA; Physician Guidelines: no    Diagnosis Precautions: none  Chart reviewed: Relevant co-morbidities, allergies, tests and medications:  has a past medical history of Left knee pain (03/2018); Use of cane as ambulatory aid (03/2018); and Wears glasses.    Referred by: J CARLOS Mayfield   Medical Diagnosis (from order): M17.11 Arthritis of right knee; R26.89 Impaired gait and mobility   Insurance: 1. MEDICAID [id number]  2.   Treatment Diagnosis: Knee Symptoms with Pain, Impaired Joint Mobility, Impaired Range of Motion, Impaired Motor Function/Muscle Performance, Impaired Gait/Locomotion Deficits and Impaired Balance    SUBJECTIVE   Jacobo reports that he at times is able to walk without the cane nor the walker at home.   Current Pain: 6/10 - posterior knee.   Functional Change: Tolerating 10-15 minutes of walking with walker outside.    OBJECTIVE     Treatment   Therapeutic Exercise:   Exercise Repetitions Sets Position/Cues   Bike, seat 6, 1/2 revolutions 6 min  For LE AAROM   Sitting Hamstring stretch @ step 30 sec 1 Neutral, right    Standing calf stretch - gastroc and soleus- with 1/2 foam roll 30 sec 2 ea right    Modified Srinivasan stretch 60 sec 1 right   Prone knee flexion contract relax stretch  1 round right   Sitting knee flexion with PT providing end-range overpressure 10 1 Able to achieve 90° with overpressure.    LAQ 10 3 Demos muscle fasciculations.   Prone knee flexion 10 3 Demos ability to perform through 75% of available knee range.                      Comments:     Manual Therapy:   STM to right quadriceps and ITB in supine.   STM to right calf and hamstrings in prone.   MFR circumferential stretching to lower leg. Significant  restrictions remain in middle 1/3 of lower leg.   Patellar mobilizations- all directions. Grade III. Demos 2/6 mobility with restrictions from min to end-range.   Tibia on femur mobilizations- A-P glides grade II to encourage flexion. Notable end-range restrictions.     Current Home Program (not performed this date except as noted above):   Access Code: [email or web address]     URL: [email or web address]            Date: 04/09/2018   Prepared by: Angie Flood      Exercises  Supine Heel Slide - 10 reps - 1 sets - 3x daily - 7x weekly  Small Range Straight Leg Raise - 10 reps - 1 sets - 3x daily - 7x weekly  Supine Knee Extension Strengthening - 10 reps - 1 sets - 3x daily - 7x weekly  Sidelying Hip Abduction - 10 reps - 1 sets - 3x daily - 7x weekly  Long Sitting Calf Stretch with Strap - 2 sets - 30 sec hold - 2x daily - 7x weekly  Seated Hamstring Stretch - 2 sets - 30 sec hold - 3x daily - 7x weekly    ASSESSMENT   Jacobo making continued but slow progress with knee flexion ROM. Continues to present with soft tissue and muscular restrictions affecting patient's ROM and pain control. Will progress as tolerated.     Pain after treatment: 6  Result of above outlined education: Verbalizes understanding and Demonstrates understanding    Goals:       To be obtained by end of this plan of care:  1. Patient independent with modified and progressed home exercise program.  2. Patient will decrease involved knee pain/symptoms to 0/10  to aid in community ambulation for activities of independent living.   3. Patient will increase involved knee active range of motion to 0-120° to aid in stair ambulation.   4. Patient will increase involved knee strength to 4+/5 to aid in completing transfers including low and soft surfaces and squatting for lifting for household independent living.  5. Patient will be able to ambulate 4 steps independent/modified independent for home access with minimal pain/difficulty.  6. Improve single  24-Nov-2022 18:24:00 leg standing balance to 27 seconds to improve ability to amb on level and unlevel surfaces to improve function in community interaction and reduce risk for falls.    PLAN   Progress right knee AROM, strengthening. Initiate balance training when appropriate.    THERAPY DAILY BILLING   Primary Insurance:  MEDICAID [id number]  Secondary Insurance:     Evaluation Procedures:  No evaluation codes were used on this date of service    Timed Procedures:  Manual Therapy, 30 minutes  Therapeutic Exercise, 15 minutes    Untimed Procedures:  No untimed codes were used on this date of service    Total Treatment Time: 45 minutes        The referring provider's electronic or written signature on the evaluation authorizes the therapy plan of care and certifies the need for these services, furnished under this plan of care while under their care.  Electronically sent for physician signature

## 2022-11-24 NOTE — H&P ADULT - NSHPLABSRESULTS_GEN_ALL_CORE
Vital Signs Last 24 Hrs  T(C): 36.7 (2022 17:09), Max: 36.7 (2022 17:09)  T(F): 98 (2022 17:09), Max: 98 (2022 17:09)  HR: 64 (2022 17:57) (63 - 83)  BP: 160/109 (2022 17:57) (142/82 - 160/109)  BP(mean): 128 (2022 17:57) (128 - 128)  RR: 18 (2022 17:57) (16 - 18)  SpO2: 98% (2022 17:57) (97% - 99%)    Parameters below as of :57  Patient On (Oxygen Delivery Method): room air    POCT Blood Glucose.: 130 mg/dL (2022 14:23)    LABS:                        14.7   3.72  )-----------( 233      ( 2022 14:21 )             41.7     11    140  |  103  |  20  ----------------------------<  133<H>  4.0   |  30  |  1.13    Ca    9.8      2022 14:21    TPro  9.1<H>  /  Alb  3.6  /  TBili  0.3  /  DBili  x   /  AST  49<H>  /  ALT  51<H>  /  AlkPhos  66  11-24    PT/INR - ( 2022 14:21 )   PT: 12.7 sec;   INR: 1.08          PTT - ( 2022 14:21 )  PTT:34.7 sec      Urinalysis Basic - ( 2022 14:22 )    Color: Yellow / Appearance: Clear / S.010 / pH: x  Gluc: x / Ketone: NEGATIVE  / Bili: NEGATIVE / Urobili: 0.2 E.U./dL   Blood: x / Protein: NEGATIVE mg/dL / Nitrite: NEGATIVE   Leuk Esterase: NEGATIVE / RBC: x / WBC x   Sq Epi: x / Non Sq Epi: x / Bacteria: x        RADIOLOGY & ADDITIONAL STUDIES:    CT Brain Stroke Protocol (22 @ 14:32)   IMPRESSION:    CT HEAD: No acute intracranial findings.    The study was performed at 2:32 PM 2022 and Dr. Araujo discussed the   above findings with Dr. Ja DEJESUS at 2:40 PM 2022.    CT PERFUSION: Suggestion of prolonged Tmax in the left temporal lobe   (Tmax >6 seconds: 6 mL) with normal CBF, findings consistent with   mismatch perfusion defect and suggestive of possible ischemia. MRI brain   may be obtained for further assessment.    CT ANGIOGRAPHY NECK: No vessel narrowing or occlusion in the neck.    CT ANGIOGRAPHY HEAD: No evidence of vascular stenosis, occlusion,   aneurysm or vascular malformation.

## 2022-11-24 NOTE — ED PROVIDER NOTE - PROGRESS NOTE DETAILS
Stroke code activated at triage Received call from Radiology who reports negative CT scan. Stroke scale repeat is still score of 3. Spoke with Dr Segal who states Pt should be admitted to telemetry neuro step down unit.

## 2022-11-24 NOTE — H&P ADULT - NSHPSOCIALHISTORY_GEN_ALL_CORE
SOCIAL HISTORY:   Patient lives by himself  Smoking status: Ex smoker, Used to smoke 1pack/day of cigarettes, Quit X 2months ago  Drinking: Social drinker, only drinks when he meets his friends/ foot ball games, admits to drinking a pint of vodka last sunday (11/20) while watching foot ball game.  Drug Use: Has used Heroin and cocaine in the past, admits to using cocaine last sunday(11/20)

## 2022-11-24 NOTE — ED ADULT NURSE REASSESSMENT NOTE - NS ED NURSE REASSESS COMMENT FT1
pt states he was seen at HIV clinic last month and told his VL was high. started a new HIV medication unsure of the name. Compliant
rpt lactate after IVF, EMS at bedside, will draw rpt blood work prior to leaving to [facility]
received pt from AZUCENA oPnce. pt in NAD, awaiting admission to [facility] under neurology. will continue to monitor

## 2022-11-25 DIAGNOSIS — D72.819 DECREASED WHITE BLOOD CELL COUNT, UNSPECIFIED: ICD-10-CM

## 2022-11-25 DIAGNOSIS — B20 HUMAN IMMUNODEFICIENCY VIRUS [HIV] DISEASE: ICD-10-CM

## 2022-11-25 DIAGNOSIS — F14.90 COCAINE USE, UNSPECIFIED, UNCOMPLICATED: ICD-10-CM

## 2022-11-25 DIAGNOSIS — I63.9 CEREBRAL INFARCTION, UNSPECIFIED: ICD-10-CM

## 2022-11-25 LAB
A1C WITH ESTIMATED AVERAGE GLUCOSE RESULT: 5.6 % — SIGNIFICANT CHANGE UP (ref 4–5.6)
ANION GAP SERPL CALC-SCNC: 7 MMOL/L — SIGNIFICANT CHANGE UP (ref 5–17)
BUN SERPL-MCNC: 15 MG/DL — SIGNIFICANT CHANGE UP (ref 7–23)
CALCIUM SERPL-MCNC: 10 MG/DL — SIGNIFICANT CHANGE UP (ref 8.4–10.5)
CHLORIDE SERPL-SCNC: 102 MMOL/L — SIGNIFICANT CHANGE UP (ref 96–108)
CHOLEST SERPL-MCNC: 184 MG/DL — SIGNIFICANT CHANGE UP
CO2 SERPL-SCNC: 29 MMOL/L — SIGNIFICANT CHANGE UP (ref 22–31)
CREAT SERPL-MCNC: 0.89 MG/DL — SIGNIFICANT CHANGE UP (ref 0.5–1.3)
EGFR: 96 ML/MIN/1.73M2 — SIGNIFICANT CHANGE UP
ESTIMATED AVERAGE GLUCOSE: 114 MG/DL — SIGNIFICANT CHANGE UP (ref 68–114)
GLUCOSE SERPL-MCNC: 87 MG/DL — SIGNIFICANT CHANGE UP (ref 70–99)
HCT VFR BLD CALC: 41.3 % — SIGNIFICANT CHANGE UP (ref 39–50)
HCV AB S/CO SERPL IA: 0.04 S/CO — SIGNIFICANT CHANGE UP
HCV AB SERPL-IMP: SIGNIFICANT CHANGE UP
HDLC SERPL-MCNC: 58 MG/DL — SIGNIFICANT CHANGE UP
HGB BLD-MCNC: 14.7 G/DL — SIGNIFICANT CHANGE UP (ref 13–17)
LIPID PNL WITH DIRECT LDL SERPL: 109 MG/DL — HIGH
MAGNESIUM SERPL-MCNC: 2.1 MG/DL — SIGNIFICANT CHANGE UP (ref 1.6–2.6)
MCHC RBC-ENTMCNC: 26.5 PG — LOW (ref 27–34)
MCHC RBC-ENTMCNC: 35.6 GM/DL — SIGNIFICANT CHANGE UP (ref 32–36)
MCV RBC AUTO: 74.5 FL — LOW (ref 80–100)
NON HDL CHOLESTEROL: 126 MG/DL — SIGNIFICANT CHANGE UP
NRBC # BLD: 0 /100 WBCS — SIGNIFICANT CHANGE UP (ref 0–0)
PHOSPHATE SERPL-MCNC: 2.6 MG/DL — SIGNIFICANT CHANGE UP (ref 2.5–4.5)
PLATELET # BLD AUTO: 221 K/UL — SIGNIFICANT CHANGE UP (ref 150–400)
POTASSIUM SERPL-MCNC: 4.5 MMOL/L — SIGNIFICANT CHANGE UP (ref 3.5–5.3)
POTASSIUM SERPL-SCNC: 4.5 MMOL/L — SIGNIFICANT CHANGE UP (ref 3.5–5.3)
RBC # BLD: 5.54 M/UL — SIGNIFICANT CHANGE UP (ref 4.2–5.8)
RBC # FLD: 14.5 % — SIGNIFICANT CHANGE UP (ref 10.3–14.5)
SODIUM SERPL-SCNC: 138 MMOL/L — SIGNIFICANT CHANGE UP (ref 135–145)
TRIGL SERPL-MCNC: 84 MG/DL — SIGNIFICANT CHANGE UP
TSH SERPL-MCNC: 1.77 UIU/ML — SIGNIFICANT CHANGE UP (ref 0.27–4.2)
WBC # BLD: 3.58 K/UL — LOW (ref 3.8–10.5)
WBC # FLD AUTO: 3.58 K/UL — LOW (ref 3.8–10.5)

## 2022-11-25 PROCEDURE — 93312 ECHO TRANSESOPHAGEAL: CPT | Mod: 26

## 2022-11-25 PROCEDURE — 93325 DOPPLER ECHO COLOR FLOW MAPG: CPT | Mod: 26

## 2022-11-25 PROCEDURE — 93320 DOPPLER ECHO COMPLETE: CPT | Mod: 26

## 2022-11-25 PROCEDURE — 70551 MRI BRAIN STEM W/O DYE: CPT | Mod: 26

## 2022-11-25 PROCEDURE — 99221 1ST HOSP IP/OBS SF/LOW 40: CPT

## 2022-11-25 PROCEDURE — 99223 1ST HOSP IP/OBS HIGH 75: CPT

## 2022-11-25 RX ORDER — QUETIAPINE FUMARATE 200 MG/1
1 TABLET, FILM COATED ORAL
Qty: 0 | Refills: 0 | DISCHARGE

## 2022-11-25 RX ORDER — ACETAMINOPHEN 500 MG
650 TABLET ORAL EVERY 6 HOURS
Refills: 0 | Status: DISCONTINUED | OUTPATIENT
Start: 2022-11-25 | End: 2022-11-30

## 2022-11-25 RX ORDER — TRAZODONE HCL 50 MG
1 TABLET ORAL
Qty: 0 | Refills: 0 | DISCHARGE

## 2022-11-25 RX ORDER — OLANZAPINE 15 MG/1
1 TABLET, FILM COATED ORAL
Qty: 0 | Refills: 0 | DISCHARGE

## 2022-11-25 RX ORDER — DARUNAVIR ETHANOLATE AND COBICISTAT 800; 150 MG/1; MG/1
1 TABLET, FILM COATED ORAL
Qty: 0 | Refills: 0 | DISCHARGE

## 2022-11-25 RX ORDER — BICTEGRAVIR SODIUM, EMTRICITABINE, AND TENOFOVIR ALAFENAMIDE FUMARATE 30; 120; 15 MG/1; MG/1; MG/1
1 TABLET ORAL
Qty: 0 | Refills: 0 | DISCHARGE

## 2022-11-25 RX ADMIN — Medication 650 MILLIGRAM(S): at 05:55

## 2022-11-25 RX ADMIN — Medication 650 MILLIGRAM(S): at 06:30

## 2022-11-25 RX ADMIN — Medication 81 MILLIGRAM(S): at 11:51

## 2022-11-25 RX ADMIN — CLOPIDOGREL BISULFATE 75 MILLIGRAM(S): 75 TABLET, FILM COATED ORAL at 11:51

## 2022-11-25 RX ADMIN — ATORVASTATIN CALCIUM 80 MILLIGRAM(S): 80 TABLET, FILM COATED ORAL at 21:00

## 2022-11-25 RX ADMIN — ENOXAPARIN SODIUM 40 MILLIGRAM(S): 100 INJECTION SUBCUTANEOUS at 18:43

## 2022-11-25 NOTE — PHYSICAL THERAPY INITIAL EVALUATION ADULT - GENERAL OBSERVATIONS, REHAB EVAL
PT IE Completed. MRS: #4. Pt received semi-supine in bed, +heplock, +tele, A&Ox4, +room air, in NAD and agreeable to work with PT, AZUCENA Powell notified. Pt presents to [facility] with R arm and leg weakness. PT exam shows R side hemiparesis, unsteady gait, and RUE/RLE decreased LT sensation. Pt completed bed mob, transfers and gait.  Pt left as found, +bed alarm, +call marsh within reach, AZUCENA Powell notified. Pt can benefit from PT in order to improve quality of life by increasing strength/endurance/balance with functional mobility and ADLS.

## 2022-11-25 NOTE — PROGRESS NOTE ADULT - ASSESSMENT
62y Male with PMHx of HIV, active cocaine use, presenting after waking up at 0800 with right arm and leg weakness. He presented to [facility] ED, stroke code initiated, NIHSS 3. NCHCT negative for trancortical infarction or hemorrhage. CTA head and neck negative for steno-occlusive disease. CT perfusion scan demonstrated an elevated Tmax in left temporal lobe concerning for ischemia. Patient transferred to [facility] for further evaluation and management. MRI showed acute left basal ganglia infarct      Neuro  #CVA workup  - s/p ASA 325mg and Plavix 300mg PO load at [facility]  - continue aspirin 81mg and plavix 75mg daily  - continue atorvastatin 80mg daily  - q4hr stroke neuro checks and vitals  - MRI Brain without contrast:  L BG infarct  - Stroke Code HCT Results: negative  - Stroke Code CTA Results: negative for steno-occlusive disease  - Stroke education    Cards  #HTN  Home amlodipine 10mg, lisinopril 20mg  - hold home bp meds for permissive hypertension, Goal -180  - TTE with bubble negative  - Stroke Code EKG Results: F/U official read.    #HLD  - high dose statin as above in CVA  - LDL results: 109    Pulm  - call provider if SPO2 < 94%    GI  #Nutrition/Fluids/Electrolytes   - replete K<4 and Mg <2  - Diet: DASH    Renal  - daily BMP    Infectious Disease      Heme  #HIV  Med rec: - prezcobix 800-150 qd AND bictavy 30 qd  both picked up same day 11/17, Rx by Dr Gian Pizano  - Hold off on ART as these two have interaction  - pending viral load    Endocrine  - A1C results: 5.6    - TSH results: pending    DVT Prophylaxis  - lovenox sq for DVT prophylaxis   - SCDs for DVT prophylaxis     Dispo: pending PT/OT eval     Discussed daily hospital plans and goals with patient.     Discussed with Neurology Attending Dr. Myriam Segal 62y Male with PMHx of HIV, active cocaine use, presenting after waking up at 0800 with right arm and leg weakness. He presented to [facility] ED, stroke code initiated, NIHSS 3. NCHCT negative for trancortical infarction or hemorrhage. CTA head and neck negative for steno-occlusive disease. CT perfusion scan demonstrated an elevated Tmax in left temporal lobe concerning for ischemia. Patient transferred to [facility] for further evaluation and management. MRI showed acute left basal ganglia infarct      Neuro  #CVA workup  - s/p ASA 325mg and Plavix 300mg PO load at [facility]  - continue aspirin 81mg and plavix 75mg daily  - continue atorvastatin 80mg daily  - q4hr stroke neuro checks and vitals  - MRI Brain without contrast:  L BG infarct  - Stroke Code HCT Results: negative  - Stroke Code CTA Results: negative for steno-occlusive disease  - Stroke education    Cards  #HTN  Home amlodipine 10mg, lisinopril 20mg  - hold home bp meds for permissive hypertension, Goal -180  - TTE with bubble negative  - Stroke Code EKG Results: F/U official read.    #HLD  - high dose statin as above in CVA  - LDL results: 109    Pulm  - call provider if SPO2 < 94%    GI  #Nutrition/Fluids/Electrolytes   - replete K<4 and Mg <2  - Diet: DASH    Renal  - daily BMP    Infectious Disease      Heme  #HIV  Med rec: - prezcobix 800-150 qd AND bictavy 30 qd  both picked up same day 11/17, Rx by Dr Gian Pizano  - Hold off on ART as these two have interaction  - pending viral load    Endocrine  - A1C results: 5.6    - TSH results: pending    DVT Prophylaxis  - lovenox sq for DVT prophylaxis   - SCDs for DVT prophylaxis     Dispo: Acute inpatient rehab     Discussed daily hospital plans and goals with patient.     Discussed with Neurology Attending Dr. Myriam Segal

## 2022-11-25 NOTE — SBIRT NOTE ADULT - NSSBIRTDRGPOSREINDET_GEN_A_CORE
Patient endorses intermittent cocaine use, sometimes on the weekend and only when with friends. SW provided support. Patient declined resources.

## 2022-11-25 NOTE — PHYSICAL THERAPY INITIAL EVALUATION ADULT - MANUAL MUSCLE TESTING RESULTS, REHAB EVAL
RUE shoulder flexion 3-/5/ elevation,elbow flexion,elbow extension,  2/5  ; LUE  shoulder flexion elevation/elbow flexion/elbow extension/ 5/5 RUE shoulder flexion 3-/5 with shoulder pain, elevation 5/5, abduction 3-/5 with shoulder pain, elbow flexion/elbow extension 5/5,  2/5 ; LUE shoulder flexion/abduction/elevation/elbow flexion/elbow extension/ 5/5

## 2022-11-25 NOTE — CONSULT NOTE ADULT - PROBLEM SELECTOR RECOMMENDATION 9
MRI brain shows "recent infarction in the left basal ganglia;" cont. work-up and mgmt per Neuro; PT/OT, speech therapy; on DAPT + statin for now; plan for DAVID

## 2022-11-25 NOTE — PHYSICAL THERAPY INITIAL EVALUATION ADULT - ADDITIONAL COMMENTS
Pt reports 1 fall within the 6 months due to drinking. He states he does not go out of the house unless he has doctor appointments. Pt reports he uses his cane at all times because of R leg weakness.

## 2022-11-25 NOTE — PROGRESS NOTE ADULT - ATTENDING COMMENTS
The patient is a 62-year-old gentleman with a history of HIV, hypertension, and polysubstance use (heroin, cocaine) who is admitted after presenting with gait unsteadiness, right-sided weakness, and stuttering speech. Head CT was negative but CTP did show an area of decreased perfusion within the left temporal lobe. CTA showed no significant findings. He was not a candidate for acute intervention. UDS + Cocaine. Plan for MRI, TTE/DAVID. Patient may require ILR. For now, continue DAPT, statin (). The patient is a 62-year-old gentleman with a history of HIV, hypertension, and polysubstance use (heroin, cocaine) who is admitted after presenting with gait unsteadiness, right-sided weakness, and stuttering speech. Head CT was negative but CTP did show an area of decreased perfusion within the left temporal lobe. CTA showed no significant findings. He was not a candidate for acute intervention. UDS + Cocaine. MRI with + L BG acute stroke. TTE/DAVID unrevealing. Will plan for ILR but cocaine use could have potentially been the cause of his stroke. For now, continue DAPT, statin ().

## 2022-11-25 NOTE — PROGRESS NOTE ADULT - SUBJECTIVE AND OBJECTIVE BOX
Neurology Progress Note    Interval History:  The patient was seen and examined at the bedside. Feels well, denied headahce, cp, sob, weakness.       PAST MEDICAL & SURGICAL HISTORY:  HIV (human immunodeficiency virus infection)    HTN (hypertension)            Medications:  acetaminophen     Tablet .. 650 milliGRAM(s) Oral every 6 hours PRN  aspirin enteric coated 81 milliGRAM(s) Oral daily  atorvastatin 80 milliGRAM(s) Oral at bedtime  clopidogrel Tablet 75 milliGRAM(s) Oral daily  enoxaparin Injectable 40 milliGRAM(s) SubCutaneous every 24 hours      Vital Signs Last 24 Hrs  T(C): 36.3 (2022 13:31), Max: 36.7 (2022 17:09)  T(F): 97.4 (2022 13:31), Max: 98 (2022 17:09)  HR: 64 (2022 12:30) (54 - 70)  BP: 142/75 (2022 12:30) (137/63 - 167/92)  BP(mean): 102 (2022 12:30) (91 - 128)  RR: 18 (2022 12:30) (16 - 18)  SpO2: 96% (2022 12:30) (93% - 99%)    Parameters below as of 2022 12:30  Patient On (Oxygen Delivery Method): room air        Neurological Exam:   Mental status: Awake, alert and oriented x3.  Recent and remote memory intact.  Naming, repetition and comprehension intact.  Attention/concentration intact.  No dysarthria, no aphasia.  Fund of knowledge appropriate.    Cranial nerves: Pupils equally round and reactive to light, visual fields full, no nystagmus, extraocular muscles intact, V1 through V3 intact bilaterally and symmetric, R nasolabial flattening, hearing intact to finger rub, palate elevation symmetric, tongue was midline.  Motor:  MRC grading 5/5 b/l UE/LE RUE drift.   strength 5/5.  Normal tone and bulk.  No abnormal movements.    Sensation: Intact to light touch, proprioception, and pinprick.   Coordination: No dysmetria on finger-to-nose and heel-to-shin.  No dysdiadokinesia.  Reflexes: 2+ in bilateral UE/LE, downgoing toes bilaterally. (-) Camejo.  Gait: Narrow and steady. No ataxia.  Romberg negative    Labs:  CBC Full  -  ( 2022 05:30 )  WBC Count : 3.58 K/uL  RBC Count : 5.54 M/uL  Hemoglobin : 14.7 g/dL  Hematocrit : 41.3 %  Platelet Count - Automated : 221 K/uL  Mean Cell Volume : 74.5 fl  Mean Cell Hemoglobin : 26.5 pg  Mean Cell Hemoglobin Concentration : 35.6 gm/dL  Auto Neutrophil # : x  Auto Lymphocyte # : x  Auto Monocyte # : x  Auto Eosinophil # : x  Auto Basophil # : x  Auto Neutrophil % : x  Auto Lymphocyte % : x  Auto Monocyte % : x  Auto Eosinophil % : x  Auto Basophil % : x    11-25    138  |  102  |  15  ----------------------------<  87  4.5   |  29  |  0.89    Ca    10.0      2022 05:30  Phos  2.6     11-25  Mg     2.1     -25    TPro  9.1<H>  /  Alb  3.6  /  TBili  0.3  /  DBili  x   /  AST  49<H>  /  ALT  51<H>  /  AlkPhos  66  11-24    LIVER FUNCTIONS - ( 2022 14:21 )  Alb: 3.6 g/dL / Pro: 9.1 g/dL / ALK PHOS: 66 U/L / ALT: 51 U/L / AST: 49 U/L / GGT: x           PT/INR - ( 2022 14:21 )   PT: 12.7 sec;   INR: 1.08          PTT - ( 2022 14:21 )  PTT:34.7 sec  Urinalysis Basic - ( 2022 14:22 )    Color: Yellow / Appearance: Clear / S.010 / pH: x  Gluc: x / Ketone: NEGATIVE  / Bili: NEGATIVE / Urobili: 0.2 E.U./dL   Blood: x / Protein: NEGATIVE mg/dL / Nitrite: NEGATIVE   Leuk Esterase: NEGATIVE / RBC: x / WBC x   Sq Epi: x / Non Sq Epi: x / Bacteria: x       Neurology Progress Note    Interval History:  The patient was seen and examined at the bedside. Feels well, denied headahce, cp, sob, weakness.       PAST MEDICAL & SURGICAL HISTORY:  HIV (human immunodeficiency virus infection)    HTN (hypertension)            Medications:  acetaminophen     Tablet .. 650 milliGRAM(s) Oral every 6 hours PRN  aspirin enteric coated 81 milliGRAM(s) Oral daily  atorvastatin 80 milliGRAM(s) Oral at bedtime  clopidogrel Tablet 75 milliGRAM(s) Oral daily  enoxaparin Injectable 40 milliGRAM(s) SubCutaneous every 24 hours      Vital Signs Last 24 Hrs  T(C): 36.3 (2022 13:31), Max: 36.7 (2022 17:09)  T(F): 97.4 (2022 13:31), Max: 98 (2022 17:09)  HR: 64 (2022 12:30) (54 - 70)  BP: 142/75 (2022 12:30) (137/63 - 167/92)  BP(mean): 102 (2022 12:30) (91 - 128)  RR: 18 (2022 12:30) (16 - 18)  SpO2: 96% (2022 12:30) (93% - 99%)    Parameters below as of 2022 12:30  Patient On (Oxygen Delivery Method): room air        Neurological Exam:   Mental status: Awake, alert and oriented x3.  Recent and remote memory intact.  Naming, repetition and comprehension intact.  Attention/concentration intact.  Intermittent stuttering. No dysarthria, no aphasia.  Fund of knowledge appropriate.    Cranial nerves: Pupils equally round and reactive to light, visual fields full, no nystagmus, extraocular muscles intact, V1 through V3 intact bilaterally and symmetric, R nasolabial flattening, hearing intact to finger rub, palate elevation symmetric, tongue was midline.  Motor:  MRC grading 5/5 b/l UE/LE RUE drift.   strength 5/5.  Normal tone and bulk.  No abnormal movements.    Sensation: Intact to light touch, proprioception, and pinprick.   Coordination: No dysmetria on finger-to-nose and heel-to-shin.  No dysdiadokinesia.  Reflexes: 2+ in bilateral UE/LE, downgoing toes bilaterally. (-) Camejo.  Gait: Narrow and steady. No ataxia.  Romberg negative    Labs:  CBC Full  -  ( 2022 05:30 )  WBC Count : 3.58 K/uL  RBC Count : 5.54 M/uL  Hemoglobin : 14.7 g/dL  Hematocrit : 41.3 %  Platelet Count - Automated : 221 K/uL  Mean Cell Volume : 74.5 fl  Mean Cell Hemoglobin : 26.5 pg  Mean Cell Hemoglobin Concentration : 35.6 gm/dL  Auto Neutrophil # : x  Auto Lymphocyte # : x  Auto Monocyte # : x  Auto Eosinophil # : x  Auto Basophil # : x  Auto Neutrophil % : x  Auto Lymphocyte % : x  Auto Monocyte % : x  Auto Eosinophil % : x  Auto Basophil % : x    11-25    138  |  102  |  15  ----------------------------<  87  4.5   |  29  |  0.89    Ca    10.0      2022 05:30  Phos  2.6     11-25  Mg     2.1     -    TPro  9.1<H>  /  Alb  3.6  /  TBili  0.3  /  DBili  x   /  AST  49<H>  /  ALT  51<H>  /  AlkPhos  66  11-24    LIVER FUNCTIONS - ( 2022 14:21 )  Alb: 3.6 g/dL / Pro: 9.1 g/dL / ALK PHOS: 66 U/L / ALT: 51 U/L / AST: 49 U/L / GGT: x           PT/INR - ( 2022 14:21 )   PT: 12.7 sec;   INR: 1.08          PTT - ( 2022 14:21 )  PTT:34.7 sec  Urinalysis Basic - ( 2022 14:22 )    Color: Yellow / Appearance: Clear / S.010 / pH: x  Gluc: x / Ketone: NEGATIVE  / Bili: NEGATIVE / Urobili: 0.2 E.U./dL   Blood: x / Protein: NEGATIVE mg/dL / Nitrite: NEGATIVE   Leuk Esterase: NEGATIVE / RBC: x / WBC x   Sq Epi: x / Non Sq Epi: x / Bacteria: x

## 2022-11-25 NOTE — PHYSICAL THERAPY INITIAL EVALUATION ADULT - IMPAIRMENTS FOUND, PT EVAL
fine motor/gait, locomotion, and balance/gross motor/muscle strength/neuromotor development and sensory integration/posture/ROM/sensory integrity

## 2022-11-25 NOTE — PHYSICAL THERAPY INITIAL EVALUATION ADULT - PLANNED THERAPY INTERVENTIONS, PT EVAL
balance training/gait training/motor coordination training/neuromuscular re-education/postural re-education/ROM/strengthening/transfer training

## 2022-11-25 NOTE — SBIRT NOTE ADULT - NSSBIRTBRIEFINTDET_GEN_A_CORE
Patient endorses alcohol use (vodka) on a weekly basis, mainly when he watches football games with friends on the weekend. SW provided support. Patient declined resources.

## 2022-11-25 NOTE — PHYSICAL THERAPY INITIAL EVALUATION ADULT - GAIT DEVIATIONS NOTED, PT EVAL
decreased yoel/decreased velocity of limb motion/decreased step length/decreased stride length/decreased weight-shifting ability

## 2022-11-25 NOTE — PHYSICAL THERAPY INITIAL EVALUATION ADULT - GAIT PATTERN USED, PT EVAL
Pt ambulated initially with R hemiplegic gait; decreased R arm swing, RLE advancement, R heel strike, narrow VINNY, L lateral shift. Once provided RW, pt able to stand midline but continues to have decreased RLE advancement. He reports he uses a cane normally, but his leg feels numb now which is not his baseline./3-point gait

## 2022-11-25 NOTE — PHYSICAL THERAPY INITIAL EVALUATION ADULT - MODALITIES TREATMENT COMMENTS
Cranial Nerves II - XII: II: PERRLA; visual fields are full to confrontation III, IV, VI: EOMI, no nystagmus appreciated. Vision H-Test: bilateral tracking and smooth pursuit grossly intact; Convergence/Divergence: intact; Vision Quadrant Test: intact V: facial sensation intact to light touch V1-V3 b/l VII: no ptosis, no facial droop, symmetric eyebrow raise and closure VIII: 50% decreased hearing to finger rub on R ear compared to L  XI: head turning /l XII: tongue protrusion midline. Cranial Nerves II - XII: II: PERRLA; visual fields are full to confrontation III, IV, VI: EOMI, no nystagmus appreciated. Vision H-Test: bilateral tracking and smooth pursuit grossly intact; Convergence/Divergence: intact; Vision Quadrant Test: intact V: facial sensation intact to light touch V1-V3 b/l VII: no ptosis, no facial droop, symmetric eyebrow raise and closure VIII: 50% decreased hearing to finger rub on R ear compared to L  XI: head turning and shoulder shrug intact bilaterally XII: tongue protrusion midline.

## 2022-11-25 NOTE — PHYSICAL THERAPY INITIAL EVALUATION ADULT - PERTINENT HX OF CURRENT PROBLEM, REHAB EVAL
61yo M with PMHx of HIV, active cocaine use, presenting after waking up at 0800 with right arm and leg weakness. He presented to [facility] ED, stroke code initiated, NIHSS 3. NCHCT negative for trancortical infarction or hemorrhage. CTA head and neck negative for steno-occlusive disease. CT perfusion scan demonstrated an elevated Tmax in left temporal lobe concerning for ischemia. Patient now transferred to [facility] for further evaluation and management. MRI demonstrated L basal ganglia recent infarct.

## 2022-11-26 DIAGNOSIS — K59.00 CONSTIPATION, UNSPECIFIED: ICD-10-CM

## 2022-11-26 DIAGNOSIS — E78.5 HYPERLIPIDEMIA, UNSPECIFIED: ICD-10-CM

## 2022-11-26 DIAGNOSIS — Z29.9 ENCOUNTER FOR PROPHYLACTIC MEASURES, UNSPECIFIED: ICD-10-CM

## 2022-11-26 DIAGNOSIS — I10 ESSENTIAL (PRIMARY) HYPERTENSION: ICD-10-CM

## 2022-11-26 LAB
ALBUMIN SERPL ELPH-MCNC: 4.2 G/DL — SIGNIFICANT CHANGE UP (ref 3.3–5)
ALP SERPL-CCNC: 63 U/L — SIGNIFICANT CHANGE UP (ref 40–120)
ALT FLD-CCNC: 32 U/L — SIGNIFICANT CHANGE UP (ref 10–45)
ANION GAP SERPL CALC-SCNC: 8 MMOL/L — SIGNIFICANT CHANGE UP (ref 5–17)
ANISOCYTOSIS BLD QL: SLIGHT — SIGNIFICANT CHANGE UP
AST SERPL-CCNC: 37 U/L — SIGNIFICANT CHANGE UP (ref 10–40)
BASOPHILS # BLD AUTO: 0.07 K/UL — SIGNIFICANT CHANGE UP (ref 0–0.2)
BASOPHILS NFR BLD AUTO: 1.8 % — SIGNIFICANT CHANGE UP (ref 0–2)
BILIRUB SERPL-MCNC: 0.6 MG/DL — SIGNIFICANT CHANGE UP (ref 0.2–1.2)
BUN SERPL-MCNC: 13 MG/DL — SIGNIFICANT CHANGE UP (ref 7–23)
CALCIUM SERPL-MCNC: 10 MG/DL — SIGNIFICANT CHANGE UP (ref 8.4–10.5)
CHLORIDE SERPL-SCNC: 101 MMOL/L — SIGNIFICANT CHANGE UP (ref 96–108)
CO2 SERPL-SCNC: 30 MMOL/L — SIGNIFICANT CHANGE UP (ref 22–31)
CREAT SERPL-MCNC: 0.9 MG/DL — SIGNIFICANT CHANGE UP (ref 0.5–1.3)
EGFR: 95 ML/MIN/1.73M2 — SIGNIFICANT CHANGE UP
EOSINOPHIL # BLD AUTO: 0.38 K/UL — SIGNIFICANT CHANGE UP (ref 0–0.5)
EOSINOPHIL NFR BLD AUTO: 9.7 % — HIGH (ref 0–6)
GIANT PLATELETS BLD QL SMEAR: PRESENT — SIGNIFICANT CHANGE UP
GLUCOSE SERPL-MCNC: 97 MG/DL — SIGNIFICANT CHANGE UP (ref 70–99)
HCT VFR BLD CALC: 43.6 % — SIGNIFICANT CHANGE UP (ref 39–50)
HGB BLD-MCNC: 15.2 G/DL — SIGNIFICANT CHANGE UP (ref 13–17)
HIV-1 VIRAL LOAD RESULT: ABNORMAL
HIV1 RNA # SERPL NAA+PROBE: SIGNIFICANT CHANGE UP
HIV1 RNA SER-IMP: SIGNIFICANT CHANGE UP
HIV1 RNA SERPL NAA+PROBE-ACNC: ABNORMAL
HIV1 RNA SERPL NAA+PROBE-LOG#: 5.12 — SIGNIFICANT CHANGE UP
LYMPHOCYTES # BLD AUTO: 1.46 K/UL — SIGNIFICANT CHANGE UP (ref 1–3.3)
LYMPHOCYTES # BLD AUTO: 37.2 % — SIGNIFICANT CHANGE UP (ref 13–44)
MACROCYTES BLD QL: SLIGHT — SIGNIFICANT CHANGE UP
MAGNESIUM SERPL-MCNC: 2.1 MG/DL — SIGNIFICANT CHANGE UP (ref 1.6–2.6)
MANUAL SMEAR VERIFICATION: SIGNIFICANT CHANGE UP
MCHC RBC-ENTMCNC: 26.3 PG — LOW (ref 27–34)
MCHC RBC-ENTMCNC: 34.9 GM/DL — SIGNIFICANT CHANGE UP (ref 32–36)
MCV RBC AUTO: 75.6 FL — LOW (ref 80–100)
MONOCYTES # BLD AUTO: 0.21 K/UL — SIGNIFICANT CHANGE UP (ref 0–0.9)
MONOCYTES NFR BLD AUTO: 5.3 % — SIGNIFICANT CHANGE UP (ref 2–14)
NEUTROPHILS # BLD AUTO: 1.71 K/UL — LOW (ref 1.8–7.4)
NEUTROPHILS NFR BLD AUTO: 43.4 % — SIGNIFICANT CHANGE UP (ref 43–77)
PHOSPHATE SERPL-MCNC: 3 MG/DL — SIGNIFICANT CHANGE UP (ref 2.5–4.5)
PLAT MORPH BLD: ABNORMAL
PLATELET # BLD AUTO: 245 K/UL — SIGNIFICANT CHANGE UP (ref 150–400)
POTASSIUM SERPL-MCNC: 4.9 MMOL/L — SIGNIFICANT CHANGE UP (ref 3.5–5.3)
POTASSIUM SERPL-SCNC: 4.9 MMOL/L — SIGNIFICANT CHANGE UP (ref 3.5–5.3)
PROT SERPL-MCNC: 8.6 G/DL — HIGH (ref 6–8.3)
RBC # BLD: 5.77 M/UL — SIGNIFICANT CHANGE UP (ref 4.2–5.8)
RBC # FLD: 14.6 % — HIGH (ref 10.3–14.5)
RBC BLD AUTO: ABNORMAL
SMUDGE CELLS # BLD: PRESENT — SIGNIFICANT CHANGE UP
SODIUM SERPL-SCNC: 139 MMOL/L — SIGNIFICANT CHANGE UP (ref 135–145)
SPHEROCYTES BLD QL SMEAR: SLIGHT — SIGNIFICANT CHANGE UP
TARGETS BLD QL SMEAR: SLIGHT — SIGNIFICANT CHANGE UP
VARIANT LYMPHS # BLD: 2.6 % — SIGNIFICANT CHANGE UP (ref 0–6)
WBC # BLD: 3.93 K/UL — SIGNIFICANT CHANGE UP (ref 3.8–10.5)
WBC # FLD AUTO: 3.93 K/UL — SIGNIFICANT CHANGE UP (ref 3.8–10.5)

## 2022-11-26 PROCEDURE — 99232 SBSQ HOSP IP/OBS MODERATE 35: CPT

## 2022-11-26 PROCEDURE — 99233 SBSQ HOSP IP/OBS HIGH 50: CPT

## 2022-11-26 RX ORDER — LACTULOSE 10 G/15ML
10 SOLUTION ORAL ONCE
Refills: 0 | Status: COMPLETED | OUTPATIENT
Start: 2022-11-26 | End: 2022-11-26

## 2022-11-26 RX ORDER — SENNA PLUS 8.6 MG/1
2 TABLET ORAL AT BEDTIME
Refills: 0 | Status: DISCONTINUED | OUTPATIENT
Start: 2022-11-26 | End: 2022-11-30

## 2022-11-26 RX ORDER — DARUNAVIR ETHANOLATE AND COBICISTAT 800; 150 MG/1; MG/1
1 TABLET, FILM COATED ORAL DAILY
Refills: 0 | Status: DISCONTINUED | OUTPATIENT
Start: 2022-11-26 | End: 2022-11-30

## 2022-11-26 RX ORDER — BICTEGRAVIR SODIUM, EMTRICITABINE, AND TENOFOVIR ALAFENAMIDE FUMARATE 30; 120; 15 MG/1; MG/1; MG/1
1 TABLET ORAL DAILY
Refills: 0 | Status: DISCONTINUED | OUTPATIENT
Start: 2022-11-26 | End: 2022-11-30

## 2022-11-26 RX ORDER — DARUNAVIR ETHANOLATE AND COBICISTAT 800; 150 MG/1; MG/1
1 TABLET, FILM COATED ORAL DAILY
Refills: 0 | Status: DISCONTINUED | OUTPATIENT
Start: 2022-11-26 | End: 2022-11-26

## 2022-11-26 RX ORDER — POLYETHYLENE GLYCOL 3350 17 G/17G
17 POWDER, FOR SOLUTION ORAL DAILY
Refills: 0 | Status: DISCONTINUED | OUTPATIENT
Start: 2022-11-26 | End: 2022-11-30

## 2022-11-26 RX ADMIN — POLYETHYLENE GLYCOL 3350 17 GRAM(S): 17 POWDER, FOR SOLUTION ORAL at 07:27

## 2022-11-26 RX ADMIN — SENNA PLUS 2 TABLET(S): 8.6 TABLET ORAL at 23:15

## 2022-11-26 RX ADMIN — ENOXAPARIN SODIUM 40 MILLIGRAM(S): 100 INJECTION SUBCUTANEOUS at 17:16

## 2022-11-26 RX ADMIN — ATORVASTATIN CALCIUM 80 MILLIGRAM(S): 80 TABLET, FILM COATED ORAL at 23:15

## 2022-11-26 RX ADMIN — CLOPIDOGREL BISULFATE 75 MILLIGRAM(S): 75 TABLET, FILM COATED ORAL at 12:22

## 2022-11-26 RX ADMIN — LACTULOSE 10 GRAM(S): 10 SOLUTION ORAL at 18:43

## 2022-11-26 RX ADMIN — Medication 81 MILLIGRAM(S): at 12:22

## 2022-11-26 NOTE — PROGRESS NOTE ADULT - PROBLEM SELECTOR PLAN 4
- high dose statin as above in CVA  - LDL results: 109 Home amlodipine 10mg, lisinopril 20mg  - hold home bp meds for permissive hypertension, Goal -180  - TTE with bubble performed- negative  - Stroke Code EKG Results: F/U official read. LDL results: 109  -c/w high dose Lipitor 80mg daily

## 2022-11-26 NOTE — CONSULT NOTE ADULT - ASSESSMENT
per Neurology    62 y o Male with PMHx of HIV, active cocaine use, presenting after waking up at 0800 with right arm and leg weakness. He presented to [facility] ED, stroke code initiated, NIHSS 3. NCHCT negative for trancortical infarction or hemorrhage. CTA head and neck negative for steno-occlusive disease. CT perfusion scan demonstrated an elevated Tmax in left temporal lobe concerning for ischemia. Patient transferred to [facility] for further evaluation and management. MRI showed acute left basal ganglia infarct      Neuro  #CVA workup  - s/p ASA 325mg and Plavix 300mg PO load at [facility]  - continue aspirin 81mg and plavix 75mg daily  - continue atorvastatin 80mg daily  - q4hr stroke neuro checks and vitals  - MRI Brain without contrast:  L BG infarct  - Stroke Code HCT Results: negative  - Stroke Code CTA Results: negative for steno-occlusive disease  - Stroke education    Cards  #HTN  Home amlodipine 10mg, lisinopril 20mg  - hold home bp meds for permissive hypertension, Goal -180  - TTE with bubble negative  - Stroke Code EKG Results: F/U official read.    #HLD  - high dose statin as above in CVA  - LDL results: 109    Pulm  - call provider if SPO2 < 94%    GI  #Nutrition/Fluids/Electrolytes   - replete K<4 and Mg <2  - Diet: DASH    Renal  - daily BMP    Infectious Disease      Heme  #HIV  Med rec: - prezcobix 800-150 qd AND bictavy 30 qd  both picked up same day 11/17, Rx by Dr Gian Pizano  - Hold off on ART as these two have interaction  - pending viral load    Endocrine  - A1C results: 5.6    - TSH results: pending    DVT Prophylaxis  - lovenox sq for DVT prophylaxis   - SCDs for DVT prophylaxis 
61 y/o M w/

## 2022-11-26 NOTE — OCCUPATIONAL THERAPY INITIAL EVALUATION ADULT - PERTINENT HX OF CURRENT PROBLEM, REHAB EVAL
61yo M with PMHx of HIV, active cocaine use, presenting after waking up at 0800 with right arm and leg weakness. He presented to [facility] ED, stroke code initiated, NIHSS 3. NCHCT negative for trancortical infarction or hemorrhage. CTA head and neck negative for steno-occlusive disease. CT perfusion scan demonstrated an elevated Tmax in left temporal lobe concerning for ischemia. Patient now transferred to [facility] for further evaluation and management. MRI demonstrated L basal ganglia recent infarct.

## 2022-11-26 NOTE — PROGRESS NOTE ADULT - NS ATTEND AMEND GEN_ALL_CORE FT
The patient is a 62-year-old gentleman with a history of HIV, hypertension, and polysubstance use (heroin, cocaine) who is admitted after presenting with gait unsteadiness, right-sided weakness, and stuttering speech. MRI with + L BG acute stroke. TTE/DAVID unrevealing. Head CTA showed no significant findings. UDS + Cocaine. Will plan for ILR but cocaine use could have potentially been the cause of his stroke. For now, continue DAPT, statin ().

## 2022-11-26 NOTE — PROGRESS NOTE ADULT - PROBLEM SELECTOR PLAN 5
Home amlodipine 10mg, lisinopril 20mg  - hold home bp meds for permissive hypertension, Goal -180  - TTE with bubble negative  - Stroke Code EKG Results: F/U official read. F:as needed  E:replete mg<2, k<4  N:dash/TLC  A:lovenox 40mg SQ Home amlodipine 10mg, lisinopril 20mg  - hold home bp meds for permissive hypertension, Goal -180  - TTE with bubble performed- negative  - Stroke Code EKG Results: F/U official read.

## 2022-11-26 NOTE — OCCUPATIONAL THERAPY INITIAL EVALUATION ADULT - GENERAL OBSERVATIONS, REHAB EVAL
OT IE completed. Orders received, chart reviewed, pt cleared for OT by AZUCENA Oconnell. Pt received semi supine in bed, NAD, +heplock, +tele. Pt A&Ox4, agreeable to OT, and tolerated session well. OT IE completed. MRS 4. Orders received, chart reviewed, pt cleared for OT by AZUCENA Oconnell. Pt received semi supine in bed, NAD, +heplock, +tele. Pt A&Ox4, agreeable to OT, and tolerated session well.

## 2022-11-26 NOTE — PROGRESS NOTE ADULT - PROBLEM SELECTOR PLAN 2
Med rec: - prezcobix 800-150 qd AND bictavy 30 qd  both picked up same day 11/17, Rx by Dr Gian Piazno  - HIV consulted, recommends getting T cell subset, and starting home Biktarvy and Prezcobix 800mg-150mg daily.  - viral load >130k  -f/u further HIV recs  -f/u morning T cell subset pt on home prezcobix 800-150 qd AND bictavy 30 qd  both picked up same day 11/17. [facility] pharmacy doesn't have biktarvy 30 (peds dosage). Holding biktarvy for now until confirmation of pt's dosage. [facility] closed [facility] 11/26 and pt unaware of dosage.  - HIV consulted, recommends getting T cell subset, and starting home Prezcobix 800mg-150mg daily.  - viral load >130k  -f/u further HIV recs  -f/u morning T cell subset  -collaterals for hoem biktarvy dosage

## 2022-11-26 NOTE — PROGRESS NOTE ADULT - SUBJECTIVE AND OBJECTIVE BOX
OVERNIGHT EVENTS:    SUBJECTIVE / INTERVAL HPI: Patient seen and examined at bedside.     VITAL SIGNS:  Vital Signs Last 24 Hrs  T(C): 36.8 (2022 10:00), Max: 36.8 (2022 22:00)  T(F): 98.3 (2022 10:00), Max: 98.3 (2022 22:00)  HR: 70 (2022 12:25) (56 - 70)  BP: 171/92 (2022 12:25) (118/60 - 171/92)  BP(mean): 125 (2022 12:25) (81 - 125)  RR: 18 (2022 12:25) (17 - 18)  SpO2: 96% (:25) (96% - 98%)    Parameters below as of 2022 12:25  Patient On (Oxygen Delivery Method): room air        PHYSICAL EXAM:    General: WDWN  HEENT: NCAT; PERRL, anicteric sclera; MMM  Neck: supple, trachea midline  Cardiovascular: S1, S2 normal; RRR, no M/G/R  Respiratory: CTABL; no W/R/R  Gastrointestinal: soft, nontender, nondistended. bowel sounds present.  Skin: no ulcerations or visible rashes appreciated  Extremities: WWP; no edema, clubbing or cyanosis  Vascular: 2+ radial, DP/PT pulses B/L  Neurological: AAOx3; CN II-XII grossly intact; no focal deficits    MEDICATIONS:  MEDICATIONS  (STANDING):  aspirin enteric coated 81 milliGRAM(s) Oral daily  atorvastatin 80 milliGRAM(s) Oral at bedtime  clopidogrel Tablet 75 milliGRAM(s) Oral daily  enoxaparin Injectable 40 milliGRAM(s) SubCutaneous every 24 hours  polyethylene glycol 3350 17 Gram(s) Oral daily  senna 2 Tablet(s) Oral at bedtime    MEDICATIONS  (PRN):  acetaminophen     Tablet .. 650 milliGRAM(s) Oral every 6 hours PRN Mild Pain (1 - 3)      ALLERGIES:  Allergies    No Known Allergies    Intolerances        LABS:                        15.2   3.93  )-----------( 245      ( 2022 06:15 )             43.6     11-    139  |  101  |  13  ----------------------------<  97  4.9   |  30  |  0.90    Ca    10.0      2022 06:15  Phos  3.0       Mg     2.1         TPro  8.6<H>  /  Alb  4.2  /  TBili  0.6  /  DBili  x   /  AST  37  /  ALT  32  /  AlkPhos  63      PT/INR - ( 2022 14:21 )   PT: 12.7 sec;   INR: 1.08          PTT - ( 2022 14:21 )  PTT:34.7 sec  Urinalysis Basic - ( 2022 14:22 )    Color: Yellow / Appearance: Clear / S.010 / pH: x  Gluc: x / Ketone: NEGATIVE  / Bili: NEGATIVE / Urobili: 0.2 E.U./dL   Blood: x / Protein: NEGATIVE mg/dL / Nitrite: NEGATIVE   Leuk Esterase: NEGATIVE / RBC: x / WBC x   Sq Epi: x / Non Sq Epi: x / Bacteria: x      CAPILLARY BLOOD GLUCOSE      POCT Blood Glucose.: 130 mg/dL (2022 14:23)      RADIOLOGY & ADDITIONAL TESTS: Reviewed. -----------TRANSFER SENDING FROM STROKE/TELE TO [facility] ----------  HPI: 62y Male with PMHx of HIV, HTN, Polysubstance use(Heroine, cocaine use years ago), but admits to recent cocaine use last  (), presented to [facility] after waking up on  at 0800 with R sided weakness, unsteady gait and stuttering speech. Per pt went to bed on  @ around 9pm in [facility], woke up the next day () 8am and felt he cannot move the right side of his body, was lying in bed approximately for 2-3hrs and around 11am was able to move his RUE/RLE a bit better than before,  but still weak, called EMS and was brought to [facility] ER. CTH with no acute intracranial pathology, CTA H/N with no LVO/HGS and CTP with prolonged Tmax in L temporal lobe.   MRI demonstrated left basal ganglia infarction. TTE with bubble negative for PFO, EF 63%. DAVID negative for thrombus. Patient currently awaiting transfer to regional floor and will get ILR on Monday before dispo to acute rehab.       OVERNIGHT EVENTS:    SUBJECTIVE / INTERVAL HPI: Patient seen and examined at bedside.     VITAL SIGNS:  Vital Signs Last 24 Hrs  T(C): 36.8 (2022 10:00), Max: 36.8 (2022 22:00)  T(F): 98.3 (2022 10:00), Max: 98.3 (2022 22:00)  HR: 70 (2022 12:25) (56 - 70)  BP: 171/92 (2022 12:25) (118/60 - 171/92)  BP(mean): 125 (2022 12:25) (81 - 125)  RR: 18 (2022 12:25) (17 - 18)  SpO2: 96% (2022 12:25) (96% - 98%)    Parameters below as of 2022 12:25  Patient On (Oxygen Delivery Method): room air        PHYSICAL EXAM:    General: WDWN  HEENT: NCAT; PERRL, anicteric sclera; MMM  Neck: supple, trachea midline  Cardiovascular: S1, S2 normal; RRR, no M/G/R  Respiratory: CTABL; no W/R/R  Gastrointestinal: soft, nontender, nondistended. bowel sounds present.  Skin: no ulcerations or visible rashes appreciated  Extremities: WWP; no edema, clubbing or cyanosis  Vascular: 2+ radial, DP/PT pulses B/L  Neurological: AAOx3; CN II-XII grossly intact; no focal deficits    MEDICATIONS:  MEDICATIONS  (STANDING):  aspirin enteric coated 81 milliGRAM(s) Oral daily  atorvastatin 80 milliGRAM(s) Oral at bedtime  clopidogrel Tablet 75 milliGRAM(s) Oral daily  enoxaparin Injectable 40 milliGRAM(s) SubCutaneous every 24 hours  polyethylene glycol 3350 17 Gram(s) Oral daily  senna 2 Tablet(s) Oral at bedtime    MEDICATIONS  (PRN):  acetaminophen     Tablet .. 650 milliGRAM(s) Oral every 6 hours PRN Mild Pain (1 - 3)      ALLERGIES:  Allergies    No Known Allergies    Intolerances        LABS:                        15.2   3.93  )-----------( 245      ( 2022 06:15 )             43.6         139  |  101  |  13  ----------------------------<  97  4.9   |  30  |  0.90    Ca    10.0      2022 06:15  Phos  3.0       Mg     2.1         TPro  8.6<H>  /  Alb  4.2  /  TBili  0.6  /  DBili  x   /  AST  37  /  ALT  32  /  AlkPhos  63      PT/INR - ( 2022 14:21 )   PT: 12.7 sec;   INR: 1.08          PTT - ( 2022 14:21 )  PTT:34.7 sec  Urinalysis Basic - ( 2022 14:22 )    Color: Yellow / Appearance: Clear / S.010 / pH: x  Gluc: x / Ketone: NEGATIVE  / Bili: NEGATIVE / Urobili: 0.2 E.U./dL   Blood: x / Protein: NEGATIVE mg/dL / Nitrite: NEGATIVE   Leuk Esterase: NEGATIVE / RBC: x / WBC x   Sq Epi: x / Non Sq Epi: x / Bacteria: x      CAPILLARY BLOOD GLUCOSE      POCT Blood Glucose.: 130 mg/dL (2022 14:23)      RADIOLOGY & ADDITIONAL TESTS: Reviewed. -----------TRANSFER SENDING FROM STROKE/TELE TO [facility] ----------  HPI: 62y Male with PMHx of HIV, HTN, Polysubstance use(Heroine, cocaine use years ago), but admits to recent cocaine use last  (), presented to [facility] after waking up on  at 0800 with R sided weakness, unsteady gait and stuttering speech. Per pt went to bed on  @ around 9pm in [facility] [place], woke up the next day () 8am and felt he cannot move the right side of his body, was lying in bed approximately for 2-3hrs and around 11am was able to move his RUE/RLE a bit better than before,  but still weak, called EMS and was brought to [facility] ER. CTH with no acute intracranial pathology, CTA H/N with no LVO/HGS and CTP with prolonged Tmax in L temporal lobe.   MRI demonstrated left basal ganglia infarction. TTE with bubble negative for PFO, EF 63%. DAVID negative for thrombus. Patient currently awaiting transfer to regional floor and will get ILR on Monday before dispo to acute rehab.       OVERNIGHT EVENTS:  JOEL ON    SUBJECTIVE / INTERVAL HPI: Patient seen and examined at bedside.     CONSTITUTIONAL: No weakness, fevers or chills  EYES/ENT: No visual changes;  No vertigo or throat pain   NECK: No pain or stiffness  RESPIRATORY: No cough, wheezing, hemoptysis; No shortness of breath  CARDIOVASCULAR: No chest pain or palpitations  GASTROINTESTINAL: No abdominal or epigastric pain. No nausea, vomiting, or hematemesis; Last BM 4 days ago.  GENITOURINARY: No dysuria, frequency or hematuria  NEUROLOGICAL: c/o R sided weakness in UE and LE  SKIN: No itching, burning, rashes, or lesions   All other review of systems is negative unless indicated above.    VITAL SIGNS:  Vital Signs Last 24 Hrs  T(C): 36.8 (2022 10:00), Max: 36.8 (2022 22:00)  T(F): 98.3 (2022 10:00), Max: 98.3 (2022 22:00)  HR: 70 (2022 12:25) (56 - 70)  BP: 171/92 (2022 12:25) (118/60 - 171/92)  BP(mean): 125 (2022 12:25) (81 - 125)  RR: 18 (2022 12:25) (17 - 18)  SpO2: 96% (2022 12:25) (96% - 98%)    Parameters below as of 2022 12:25  Patient On (Oxygen Delivery Method): room air        PHYSICAL EXAM:    General: NAD, talks with stutter, talkative and cooperative  HEENT: NCAT; PERRL, anicteric sclera; MMM  Neck: supple, trachea midline  Cardiovascular: S1, S2 normal; RRR, no M/G/R  Respiratory: CTABL; no W/R/R  Gastrointestinal: soft, nontender, nondistended. bowel sounds present.  Skin: no ulcerations or visible rashes appreciated  Extremities: WWP; no edema, clubbing or cyanosis  Vascular: 2+ radial, DP/PT pulses B/L  Neurological: AAOx3; CN II-XII grossly intact; no focal deficits, strength 3-4/5 in RUE and RLE    MEDICATIONS:  MEDICATIONS  (STANDING):  aspirin enteric coated 81 milliGRAM(s) Oral daily  atorvastatin 80 milliGRAM(s) Oral at bedtime  clopidogrel Tablet 75 milliGRAM(s) Oral daily  enoxaparin Injectable 40 milliGRAM(s) SubCutaneous every 24 hours  polyethylene glycol 3350 17 Gram(s) Oral daily  senna 2 Tablet(s) Oral at bedtime    MEDICATIONS  (PRN):  acetaminophen     Tablet .. 650 milliGRAM(s) Oral every 6 hours PRN Mild Pain (1 - 3)      ALLERGIES:  Allergies    No Known Allergies    Intolerances        LABS:                        15.2   3.93  )-----------( 245      ( 2022 06:15 )             43.6     11-    139  |  101  |  13  ----------------------------<  97  4.9   |  30  |  0.90    Ca    10.0      2022 06:15  Phos  3.0     -  Mg     2.1     -    TPro  8.6<H>  /  Alb  4.2  /  TBili  0.6  /  DBili  x   /  AST  37  /  ALT  32  /  AlkPhos  63  11-26    PT/INR - ( 2022 14:21 )   PT: 12.7 sec;   INR: 1.08          PTT - ( 2022 14:21 )  PTT:34.7 sec  Urinalysis Basic - ( 2022 14:22 )    Color: Yellow / Appearance: Clear / S.010 / pH: x  Gluc: x / Ketone: NEGATIVE  / Bili: NEGATIVE / Urobili: 0.2 E.U./dL   Blood: x / Protein: NEGATIVE mg/dL / Nitrite: NEGATIVE   Leuk Esterase: NEGATIVE / RBC: x / WBC x   Sq Epi: x / Non Sq Epi: x / Bacteria: x      CAPILLARY BLOOD GLUCOSE      POCT Blood Glucose.: 130 mg/dL (2022 14:23)      RADIOLOGY & ADDITIONAL TESTS: Reviewed. -----------TRANSFER SENDING FROM STROKE/TELE TO [facility] ----------  HPI: 62y Male with PMHx of HIV, HTN, Polysubstance use(Heroine, cocaine use years ago), but admits to recent cocaine use last  (), presented to [facility] after waking up on  at 0800 with R sided weakness, unsteady gait and stuttering speech. Per pt went to bed on  @ around 9pm in [facility] [place] [place], woke up the next day () 8am and felt he cannot move the right side of his body, was lying in bed approximately for 2-3hrs and around 11am was able to move his RUE/RLE a bit better than before,  but still weak, called EMS and was brought to [facility] ER. CTH with no acute intracranial pathology, CTA H/N with no LVO/HGS and CTP with prolonged Tmax in L temporal lobe.   MRI demonstrated left basal ganglia infarction. TTE with bubble negative for PFO, EF 63%. DAVID negative for thrombus. Patient currently awaiting transfer to regional floor and will get ILR on Monday before dispo to acute rehab.       OVERNIGHT EVENTS:  JOEL ON    SUBJECTIVE / INTERVAL HPI: Patient seen and examined at bedside.     CONSTITUTIONAL: No weakness, fevers or chills  EYES/ENT: No visual changes;  No vertigo or throat pain   NECK: No pain or stiffness  RESPIRATORY: No cough, wheezing, hemoptysis; No shortness of breath  CARDIOVASCULAR: No chest pain or palpitations  GASTROINTESTINAL: No abdominal or epigastric pain. No nausea, vomiting, or hematemesis; Last BM 4 days ago, c/o constipation.  GENITOURINARY: No dysuria, frequency or hematuria  NEUROLOGICAL: c/o R sided weakness in UE and LE  SKIN: No itching, burning, rashes, or lesions   All other review of systems is negative unless indicated above.    VITAL SIGNS:  Vital Signs Last 24 Hrs  T(C): 36.8 (2022 10:00), Max: 36.8 (2022 22:00)  T(F): 98.3 (2022 10:00), Max: 98.3 (2022 22:00)  HR: 70 (2022 12:25) (56 - 70)  BP: 171/92 (2022 12:25) (118/60 - 171/92)  BP(mean): 125 (2022 12:25) (81 - 125)  RR: 18 (2022 12:25) (17 - 18)  SpO2: 96% (2022 12:25) (96% - 98%)    Parameters below as of 2022 12:25  Patient On (Oxygen Delivery Method): room air        PHYSICAL EXAM:    General: NAD, talks with stutter, talkative and cooperative  HEENT: NCAT; PERRL, anicteric sclera; MMM  Neck: supple, trachea midline  Cardiovascular: S1, S2 normal; RRR, no M/G/R  Respiratory: CTABL; no W/R/R  Gastrointestinal: soft, nontender, mildly distended. bowel sounds present.  Skin: no ulcerations or visible rashes appreciated  Extremities: WWP; no edema, clubbing or cyanosis  Vascular: 2+ radial, DP/PT pulses B/L  Neurological: AAOx3; CN II-XII grossly intact; no focal deficits, strength 3-4/5 in RUE and RLE    MEDICATIONS:  MEDICATIONS  (STANDING):  aspirin enteric coated 81 milliGRAM(s) Oral daily  atorvastatin 80 milliGRAM(s) Oral at bedtime  clopidogrel Tablet 75 milliGRAM(s) Oral daily  enoxaparin Injectable 40 milliGRAM(s) SubCutaneous every 24 hours  polyethylene glycol 3350 17 Gram(s) Oral daily  senna 2 Tablet(s) Oral at bedtime    MEDICATIONS  (PRN):  acetaminophen     Tablet .. 650 milliGRAM(s) Oral every 6 hours PRN Mild Pain (1 - 3)      ALLERGIES:  Allergies    No Known Allergies    Intolerances        LABS:                        15.2   3.93  )-----------( 245      ( 2022 06:15 )             43.6     -    139  |  101  |  13  ----------------------------<  97  4.9   |  30  |  0.90    Ca    10.0      2022 06:15  Phos  3.0     -  Mg     2.1     -    TPro  8.6<H>  /  Alb  4.2  /  TBili  0.6  /  DBili  x   /  AST  37  /  ALT  32  /  AlkPhos  63  11-26    PT/INR - ( 2022 14:21 )   PT: 12.7 sec;   INR: 1.08          PTT - ( 2022 14:21 )  PTT:34.7 sec  Urinalysis Basic - ( 2022 14:22 )    Color: Yellow / Appearance: Clear / S.010 / pH: x  Gluc: x / Ketone: NEGATIVE  / Bili: NEGATIVE / Urobili: 0.2 E.U./dL   Blood: x / Protein: NEGATIVE mg/dL / Nitrite: NEGATIVE   Leuk Esterase: NEGATIVE / RBC: x / WBC x   Sq Epi: x / Non Sq Epi: x / Bacteria: x      CAPILLARY BLOOD GLUCOSE      POCT Blood Glucose.: 130 mg/dL (2022 14:23)      RADIOLOGY & ADDITIONAL TESTS: Reviewed.

## 2022-11-26 NOTE — OCCUPATIONAL THERAPY INITIAL EVALUATION ADULT - DIAGNOSIS, OT EVAL
Pt presenting with R sided weakness, impaired postural control, decreased balance, and impaired functional activity tolerance, impacting ability to perform functional mobility/ADLs.

## 2022-11-26 NOTE — PROGRESS NOTE ADULT - PROBLEM SELECTOR PLAN 3
Med rec: - prezcobix 800-150 qd AND bictavy 30 qd  both picked up same day 11/17, Rx by Dr Gian Pizano  - Hold off on ART as these two have interaction  - pending viral load, will need formal HIV consult to discuss medication LDL results: 109  -c/w high dose Lipitor 80mg daily pt c/o feeling constipated. Reports last BM 4 days ago. Currently on miralax and senna.    -given laculose 10g 1x  -monitor BM

## 2022-11-26 NOTE — OCCUPATIONAL THERAPY INITIAL EVALUATION ADULT - ADDITIONAL COMMENTS
Pt lives alone in apartment with elevator access and no PHILLIP. Prior to admission pt used a cane as needed for ambulation 2/2 R LE weakness, however performed all ADLs independently and w/o use of further AD/AE.

## 2022-11-26 NOTE — PROGRESS NOTE ADULT - ASSESSMENT
62y Male with PMHx of HIV, active cocaine use, presenting after waking up at 0800 with right arm and leg weakness. He presented to [facility] ED, stroke code initiated, NIHSS 3. NCHCT negative for trancortical infarction or hemorrhage. CTA head and neck negative for steno-occlusive disease. CT perfusion scan demonstrated an elevated Tmax in left temporal lobe concerning for ischemia. Patient transferred to [facility] for further evaluation and management. MRI showed acute left basal ganglia infarct      Neuro  #CVA workup  - s/p ASA 325mg and Plavix 300mg PO load at [facility]  - continue aspirin 81mg and plavix 75mg daily  - continue atorvastatin 80mg daily  - q4hr stroke neuro checks and vitals  - MRI Brain without contrast:  L BG infarct  - Stroke Code HCT Results: negative  - Stroke Code CTA Results: negative for steno-occlusive disease  - Stroke education    Cards  #HTN  Home amlodipine 10mg, lisinopril 20mg  - hold home bp meds for permissive hypertension, Goal -180  - TTE with bubble negative  - Stroke Code EKG Results: F/U official read.    #HLD  - high dose statin as above in CVA  - LDL results: 109    Pulm  - call provider if SPO2 < 94%    GI  #Nutrition/Fluids/Electrolytes   - replete K<4 and Mg <2  - Diet: DASH    Renal  - daily BMP    Infectious Disease      Heme  #HIV  Med rec: - prezcobix 800-150 qd AND bictavy 30 qd  both picked up same day 11/17, Rx by Dr Gian Pizano  - Hold off on ART as these two have interaction  - pending viral load, will need formal HIV consult to discuss medications    Endocrine  - A1C results: 5.6    - TSH results: pending    DVT Prophylaxis  - lovenox sq for DVT prophylaxis   - SCDs for DVT prophylaxis     Dispo: Acute inpatient rehab     Discussed daily hospital plans and goals with patient.     Discussed with Neurology Attending Dr. Myriam Segal

## 2022-11-26 NOTE — PROGRESS NOTE ADULT - PROBLEM SELECTOR PLAN 1
- s/p ASA 325mg and Plavix 300mg PO load at [facility]  - continue aspirin 81mg and plavix 75mg daily  - continue atorvastatin 80mg daily  - q4hr stroke neuro checks and vitals  - MRI Brain without contrast:  L BG infarct  - Stroke Code HCT Results: negative  - Stroke Code CTA Results: negative for steno-occlusive disease  - Stroke education

## 2022-11-26 NOTE — OCCUPATIONAL THERAPY INITIAL EVALUATION ADULT - MODALITIES TREATMENT COMMENTS
Pt able to ambulate in room/hallway with SBAx1 using RW, demo impaired ability to weight shift 2/2 R LE weakness/decreased motor control, contributing to overall balance impairment.// Cranial Nerves II - XII: II: PERRLA; visual fields are full to confrontation III, IV, VI: EOMI, no nystagmus appreciated V: facial sensation intact to light touch V1-V3 b/l VII: no ptosis, no facial droop, symmetric eyebrow raise and closure VIII: hearing intact to finger rub b/l  XI: head turning and shoulder shrug intact b/l XII: tongue protrusion midline // Pt is R hand dominant

## 2022-11-26 NOTE — PROGRESS NOTE ADULT - ASSESSMENT
62 years old male with hx of HIV on antiviral, active Cocaine use, HTN on monotherapy, right-handed person ( PMD at [place] [address] )- presented to [facility] for right sided weakness- NIHSS 3 - MRI with acute infarct on Left basal ganglion, TTE/DAVID done on 11/25- non revealing   currently sinus on tele.

## 2022-11-26 NOTE — PROGRESS NOTE ADULT - ASSESSMENT
62 years old male with hx of HIV on antiviral, active Cocaine use, HTN on monotherapy, right-handed person ( PMD at [place] [address] )- presented to [facility] for right sided weakness- NIHSS 3 - MRI with acute infarct on Left basal ganglion, TTE/DAVID done on 11/25- non revealing   currently sinus on tele.     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    - tele reviewed- mostly in sinus,  - on DAPT   - high intensitiy high dose statin ( acute stroke )- atorvastatin 80 mg - LDL is 109 ( goal is < 70  - neuro planning for ILQ   - HTN -- permissive hypertension for acute stroke, to restart amlodipine at low dose when determined safe from neuro stand point.  - Not diabetic- hb a1c is 5.6   - HIV on Antiviral   - PT/OT evaluated- rec for Acute rehab   - dvt prophylasix.    Thank you for allowing to participate in the care, medicine comanagement will follow.

## 2022-11-26 NOTE — PROGRESS NOTE ADULT - SUBJECTIVE AND OBJECTIVE BOX
Neurology Stroke Progress Note    INTERVAL HPI/OVERNIGHT EVENTS:  Patient seen and examined.  number ______ used.    MEDICATIONS  (STANDING):  aspirin enteric coated 81 milliGRAM(s) Oral daily  atorvastatin 80 milliGRAM(s) Oral at bedtime  clopidogrel Tablet 75 milliGRAM(s) Oral daily  enoxaparin Injectable 40 milliGRAM(s) SubCutaneous every 24 hours  polyethylene glycol 3350 17 Gram(s) Oral daily  senna 2 Tablet(s) Oral at bedtime    MEDICATIONS  (PRN):  acetaminophen     Tablet .. 650 milliGRAM(s) Oral every 6 hours PRN Mild Pain (1 - 3)      Allergies    No Known Allergies    Intolerances        Vital Signs Last 24 Hrs  T(C): 36.8 (2022 06:00), Max: 36.8 (2022 22:00)  T(F): 98.3 (2022 06:00), Max: 98.3 (2022 22:00)  HR: 60 (2022 04:01) (56 - 70)  BP: 142/82 (2022 04:01) (118/60 - 167/92)  BP(mean): 106 (2022 04:01) (81 - 122)  RR: 18 (2022 04:01) (16 - 18)  SpO2: 98% (2022 04:01) (93% - 99%)    Parameters below as of 2022 04:  Patient On (Oxygen Delivery Method): room air        Physical exam:  General: No acute distress, awake and alert  Eyes: Anicteric sclerae, moist conjunctivae, see below for CNs  Neck: trachea midline, FROM, supple, no thyromegaly or lymphadenopathy  Cardiovascular: Regular rate and rhythm, no murmurs, rubs, or gallops. No carotid bruits.   Pulmonary: Anterior breath sounds clear bilaterally, no crackles or wheezing. No use of accessory muscles  GI: Abdomen soft, non-distended, non-tender  Extremities: Radial and DP pulses +2, no edema    Neurologic:  -Mental status: Awake, alert, oriented to person, place, and time. Speech is fluent with intact naming, repetition, and comprehension, no dysarthria. Recent and remote memory intact. Follows commands. Attention/concentration intact. Fund of knowledge appropriate.  -Cranial nerves:   II: Visual fields are full to confrontation.  III, IV, VI: Extraocular movements are intact without nystagmus. Pupils equally round and reactive to light  V:  Facial sensation V1-V3 equal and intact   VII: Face is symmetric with normal eye closure and smile  VIII: Hearing is bilaterally intact to finger rub  IX, X: Uvula is midline and soft palate rises symmetrically  XI: Head turning and shoulder shrug are intact.  XII: Tongue protrudes midline  Motor: Normal bulk and tone. No pronator drift. Strength bilateral upper extremity 5/5, bilateral lower extremities 5/5.  Rapid alternating movements intact and symmetric  Sensation: Intact to light touch bilaterally. No neglect or extinction on double simultaneous testing.  Coordination: No dysmetria on finger-to-nose and heel-to-shin bilaterally  Reflexes: Downgoing toes bilaterally   Gait: Narrow gait and steady    LABS:                        15.2   3.93  )-----------( 245      ( 2022 06:15 )             43.6     11-    139  |  101  |  13  ----------------------------<  97  4.9   |  30  |  0.90    Ca    10.0      2022 06:15  Phos  3.0     -  Mg     2.1     -    TPro  8.6<H>  /  Alb  4.2  /  TBili  0.6  /  DBili  x   /  AST  37  /  ALT  32  /  AlkPhos  63  -    PT/INR - ( 2022 14:21 )   PT: 12.7 sec;   INR: 1.08          PTT - ( 2022 14:21 )  PTT:34.7 sec  Urinalysis Basic - ( 2022 14:22 )    Color: Yellow / Appearance: Clear / S.010 / pH: x  Gluc: x / Ketone: NEGATIVE  / Bili: NEGATIVE / Urobili: 0.2 E.U./dL   Blood: x / Protein: NEGATIVE mg/dL / Nitrite: NEGATIVE   Leuk Esterase: NEGATIVE / RBC: x / WBC x   Sq Epi: x / Non Sq Epi: x / Bacteria: x        RADIOLOGY & ADDITIONAL TESTS:     -----------TRANSFER SENDING FROM STROKE/TELE TO REGIONAL ----------  HPI: 62y Male with PMHx of HIV, HTN, Polysubstance use(Heroine, cocaine use years ago), but admits to recent cocaine use last  (), presented to [facility] after waking up on  at 0800 with R sided weakness, unsteady gait and stuttering speech. Per pt went to bed on  @ around 9pm [place] [facility] [place] woke up the next day () 8am and felt he cannot move the right side of his body, was lying in bed approximately for 2-3hrs and around 11am was able to move his RUE/RLE a bit better than before,  but still weak, called EMS and was brought to [facility] ER. CTH with no acute intracranial pathology, CTA H/N with no LVO/HGS and CTP with prolonged Tmax in L temporal lobe.   MRI demonstrated left basal ganglia infarction. TTE with bubble negative for PFO, EF 63%. DAVID negative for thrombus. Patient currently awaiting transfer to regional floor and will get ILR on Monday before dispo to acute rehab.     Neurology Stroke Progress Note    INTERVAL HPI/OVERNIGHT EVENTS:  Patient seen and examined. Patient reports he is "scared" and wants to make sure he will be discharged to a rehab.     MEDICATIONS  (STANDING):  aspirin enteric coated 81 milliGRAM(s) Oral daily  atorvastatin 80 milliGRAM(s) Oral at bedtime  clopidogrel Tablet 75 milliGRAM(s) Oral daily  enoxaparin Injectable 40 milliGRAM(s) SubCutaneous every 24 hours  polyethylene glycol 3350 17 Gram(s) Oral daily  senna 2 Tablet(s) Oral at bedtime    MEDICATIONS  (PRN):  acetaminophen     Tablet .. 650 milliGRAM(s) Oral every 6 hours PRN Mild Pain (1 - 3)      Allergies    No Known Allergies    Intolerances        Vital Signs Last 24 Hrs  T(C): 36.8 (2022 06:00), Max: 36.8 (2022 22:00)  T(F): 98.3 (2022 06:00), Max: 98.3 (2022 22:00)  HR: 60 (2022 04:01) (56 - 70)  BP: 142/82 (2022 04:01) (118/60 - 167/92)  BP(mean): 106 (2022 04:01) (81 - 122)  RR: 18 (2022 04:01) (16 - 18)  SpO2: 98% (2022 04:01) (93% - 99%)    Parameters below as of 2022 04:01  Patient On (Oxygen Delivery Method): room air    Neurologic:  -Mental status: Awake, alert, oriented to person, place, and time. Speech is fluent with intact naming, repetition, and comprehension, Slurred speech and intermittent stuttering. Recent and remote memory intact. Follows commands. Attention/concentration intact. Fund of knowledge appropriate.  -Cranial nerves:   II: Visual fields are full to confrontation.  III, IV, VI: Extraocular movements are intact without nystagmus. Pupils equally round and reactive to light  V: Intact V1-V3  VII: R NLFF  Motor: Normal bulk and tone. No pronator drift. LUE/LLE : 5/5, RUE : Antigravity, able to lift off the bed and hold it for count of 10, does not hit the bed, but unable to lift as high as left arm, minimal wavering. Right lower extremity 4+/5.   Sensation: Intact to light touch bilaterally. No neglect or extinction on double simultaneous testing.  Coordination: No dysmetria on finger-to-nose out of proportion to weakness.      LABS:                        15.2   3.93  )-----------( 245      ( 2022 06:15 )             43.6         139  |  101  |  13  ----------------------------<  97  4.9   |  30  |  0.90    Ca    10.0      2022 06:15  Phos  3.0       Mg     2.1         TPro  8.6<H>  /  Alb  4.2  /  TBili  0.6  /  DBili  x   /  AST  37  /  ALT  32  /  AlkPhos  63  -    PT/INR - ( 2022 14:21 )   PT: 12.7 sec;   INR: 1.08          PTT - ( 2022 14:21 )  PTT:34.7 sec  Urinalysis Basic - ( 2022 14:22 )    Color: Yellow / Appearance: Clear / S.010 / pH: x  Gluc: x / Ketone: NEGATIVE  / Bili: NEGATIVE / Urobili: 0.2 E.U./dL   Blood: x / Protein: NEGATIVE mg/dL / Nitrite: NEGATIVE   Leuk Esterase: NEGATIVE / RBC: x / WBC x   Sq Epi: x / Non Sq Epi: x / Bacteria: x        RADIOLOGY & ADDITIONAL TESTS:  < from: MR Head No Cont (22 @ 08:56) >    IMPRESSION:    Recent infarction in the left basal ganglia. No significant mass effect.   No evidence of hemorrhage.    < end of copied text >

## 2022-11-26 NOTE — PROGRESS NOTE ADULT - SUBJECTIVE AND OBJECTIVE BOX
ROJELIO SMITH , [id number],  [facility] [address] [address]    Time of encounter :  9 am     CC : in great spirit.   right handed person,   still with weakness on right side.     T(C): 36.8 (22 @ 06:00), Max: 36.8 (22 @ 22:00)  HR: 66 (22 @ 08:42) (56 - 70)  BP: 165/89 (22 @ 08:42) (118/60 - 167/92)  RR: 18 (22 @ 08:42) (17 - 18)  SpO2: 97% (22 @ 08:42) (93% - 98%)    acetaminophen     Tablet .. 650 milliGRAM(s) Oral every 6 hours PRN  aspirin enteric coated 81 milliGRAM(s) Oral daily  atorvastatin 80 milliGRAM(s) Oral at bedtime  clopidogrel Tablet 75 milliGRAM(s) Oral daily  enoxaparin Injectable 40 milliGRAM(s) SubCutaneous every 24 hours  polyethylene glycol 3350 17 Gram(s) Oral daily  senna 2 Tablet(s) Oral at bedtime      Physical Exam :    General exam :  well built, not in distress, saturating well on room air. stuttering.   CVS : RRR no murmur, JVP not elevated  Lungs : good air entry bilaterally   Abdomen : BS present, soft, not tender, not distended.  Extremities: no edema, no tenderness.  Neuro : AAO x 3 -- right upper ext 4/5, right lower ext 3/5   Skin : warm and dry.   :  no meadows.      CBC Full  -  ( 2022 06:15 )  WBC Count : 3.93 K/uL  RBC Count : 5.77 M/uL  Hemoglobin : 15.2 g/dL  Hematocrit : 43.6 %  Platelet Count - Automated : 245 K/uL  Mean Cell Volume : 75.6 fl  Mean Cell Hemoglobin : 26.3 pg  Mean Cell Hemoglobin Concentration : 34.9 gm/dL  Auto Neutrophil # : 1.71 K/uL  Auto Lymphocyte # : 1.46 K/uL  Auto Monocyte # : 0.21 K/uL  Auto Eosinophil # : 0.38 K/uL  Auto Basophil # : 0.07 K/uL  Auto Neutrophil % : 43.4 %  Auto Lymphocyte % : 37.2 %  Auto Monocyte % : 5.3 %  Auto Eosinophil % : 9.7 %  Auto Basophil % : 1.8 %            139  |  101  |  13  ----------------------------<  97  4.9   |  30  |  0.90    Ca    10.0      2022 06:15  Phos  3.0       Mg     2.1         TPro  8.6<H>  /  Alb  4.2  /  TBili  0.6  /  DBili  x   /  AST  37  /  ALT  32  /  AlkPhos  63      Daily     Daily   CAPILLARY BLOOD GLUCOSE      POCT Blood Glucose.: 130 mg/dL (2022 14:23)      Urinalysis Basic - ( 2022 14:22 )    Color: Yellow / Appearance: Clear / S.010 / pH: x  Gluc: x / Ketone: NEGATIVE  / Bili: NEGATIVE / Urobili: 0.2 E.U./dL   Blood: x / Protein: NEGATIVE mg/dL / Nitrite: NEGATIVE   Leuk Esterase: NEGATIVE / RBC: x / WBC x   Sq Epi: x / Non Sq Epi: x / Bacteria: x        PT/INR - ( 2022 14:21 )   PT: 12.7 sec;   INR: 1.08          PTT - ( 2022 14:21 )  PTT:34.7 sec    imaging reviewed.  TTE, DAVID reviewed.

## 2022-11-27 LAB
ANION GAP SERPL CALC-SCNC: 8 MMOL/L — SIGNIFICANT CHANGE UP (ref 5–17)
BUN SERPL-MCNC: 15 MG/DL — SIGNIFICANT CHANGE UP (ref 7–23)
CALCIUM SERPL-MCNC: 9.7 MG/DL — SIGNIFICANT CHANGE UP (ref 8.4–10.5)
CHLORIDE SERPL-SCNC: 101 MMOL/L — SIGNIFICANT CHANGE UP (ref 96–108)
CO2 SERPL-SCNC: 28 MMOL/L — SIGNIFICANT CHANGE UP (ref 22–31)
CREAT SERPL-MCNC: 0.93 MG/DL — SIGNIFICANT CHANGE UP (ref 0.5–1.3)
EGFR: 92 ML/MIN/1.73M2 — SIGNIFICANT CHANGE UP
GLUCOSE SERPL-MCNC: 105 MG/DL — HIGH (ref 70–99)
HCT VFR BLD CALC: 43.8 % — SIGNIFICANT CHANGE UP (ref 39–50)
HGB BLD-MCNC: 15.3 G/DL — SIGNIFICANT CHANGE UP (ref 13–17)
MAGNESIUM SERPL-MCNC: 2.1 MG/DL — SIGNIFICANT CHANGE UP (ref 1.6–2.6)
MCHC RBC-ENTMCNC: 26.2 PG — LOW (ref 27–34)
MCHC RBC-ENTMCNC: 34.9 GM/DL — SIGNIFICANT CHANGE UP (ref 32–36)
MCV RBC AUTO: 75 FL — LOW (ref 80–100)
NRBC # BLD: 0 /100 WBCS — SIGNIFICANT CHANGE UP (ref 0–0)
PHOSPHATE SERPL-MCNC: 2.5 MG/DL — SIGNIFICANT CHANGE UP (ref 2.5–4.5)
PLATELET # BLD AUTO: 234 K/UL — SIGNIFICANT CHANGE UP (ref 150–400)
POTASSIUM SERPL-MCNC: 4.3 MMOL/L — SIGNIFICANT CHANGE UP (ref 3.5–5.3)
POTASSIUM SERPL-SCNC: 4.3 MMOL/L — SIGNIFICANT CHANGE UP (ref 3.5–5.3)
RBC # BLD: 5.84 M/UL — HIGH (ref 4.2–5.8)
RBC # FLD: 14.5 % — SIGNIFICANT CHANGE UP (ref 10.3–14.5)
SODIUM SERPL-SCNC: 137 MMOL/L — SIGNIFICANT CHANGE UP (ref 135–145)
WBC # BLD: 3.52 K/UL — LOW (ref 3.8–10.5)
WBC # FLD AUTO: 3.52 K/UL — LOW (ref 3.8–10.5)

## 2022-11-27 PROCEDURE — 99232 SBSQ HOSP IP/OBS MODERATE 35: CPT

## 2022-11-27 PROCEDURE — 99233 SBSQ HOSP IP/OBS HIGH 50: CPT

## 2022-11-27 RX ORDER — LIDOCAINE 4 G/100G
1 CREAM TOPICAL DAILY
Refills: 0 | Status: DISCONTINUED | OUTPATIENT
Start: 2022-11-27 | End: 2022-11-30

## 2022-11-27 RX ORDER — LISINOPRIL 2.5 MG/1
5 TABLET ORAL DAILY
Refills: 0 | Status: DISCONTINUED | OUTPATIENT
Start: 2022-11-27 | End: 2022-11-30

## 2022-11-27 RX ADMIN — LISINOPRIL 5 MILLIGRAM(S): 2.5 TABLET ORAL at 11:11

## 2022-11-27 RX ADMIN — DARUNAVIR ETHANOLATE AND COBICISTAT 1 TABLET(S): 800; 150 TABLET, FILM COATED ORAL at 11:10

## 2022-11-27 RX ADMIN — LIDOCAINE 1 PATCH: 4 CREAM TOPICAL at 18:44

## 2022-11-27 RX ADMIN — BICTEGRAVIR SODIUM, EMTRICITABINE, AND TENOFOVIR ALAFENAMIDE FUMARATE 1 TABLET(S): 30; 120; 15 TABLET ORAL at 11:10

## 2022-11-27 RX ADMIN — CLOPIDOGREL BISULFATE 75 MILLIGRAM(S): 75 TABLET, FILM COATED ORAL at 11:10

## 2022-11-27 RX ADMIN — ATORVASTATIN CALCIUM 80 MILLIGRAM(S): 80 TABLET, FILM COATED ORAL at 21:17

## 2022-11-27 RX ADMIN — POLYETHYLENE GLYCOL 3350 17 GRAM(S): 17 POWDER, FOR SOLUTION ORAL at 11:10

## 2022-11-27 RX ADMIN — Medication 81 MILLIGRAM(S): at 11:10

## 2022-11-27 RX ADMIN — LIDOCAINE 1 PATCH: 4 CREAM TOPICAL at 19:38

## 2022-11-27 RX ADMIN — SENNA PLUS 2 TABLET(S): 8.6 TABLET ORAL at 21:17

## 2022-11-27 RX ADMIN — ENOXAPARIN SODIUM 40 MILLIGRAM(S): 100 INJECTION SUBCUTANEOUS at 18:44

## 2022-11-27 NOTE — PROGRESS NOTE ADULT - SUBJECTIVE AND OBJECTIVE BOX
ROJELIO SMITH , [id number],  [facility] [address] [address]    Time of encounter :  10:30 am     CC : in great spirit.   still with weakness on right side.   stated he has been on HIV meds for 15 years, current regime was started only recently.   we talked about Cocaine could be the cause of stroke, discussed about cocaine abstinence/smoking cessation, he stated "no more"     ICU Vital Signs Last 24 Hrs  T(C): 36.6 (2022 11:00), Max: 36.7 (2022 20:35)  T(F): 97.8 (2022 11:00), Max: 98.1 (2022 20:35)  HR: 65 (2022 11:00) (60 - 65)  BP: 147/87 (2022 11:00) (147/87 - 149/92)  BP(mean): --  ABP: --  ABP(mean): --  RR: 18 (2022 11:00) (16 - 18)  SpO2: 99% (2022 11:00) (98% - 99%)    O2 Parameters below as of 2022 11:00  Patient On (Oxygen Delivery Method): room air        MEDICATIONS  (STANDING):  aspirin enteric coated 81 milliGRAM(s) Oral daily  atorvastatin 80 milliGRAM(s) Oral at bedtime  bictegravir 50 mG/emtricitabine 200 mG/tenofovir alafenamide 25 mG (BIKTARVY) 1 Tablet(s) Oral daily  clopidogrel Tablet 75 milliGRAM(s) Oral daily  darunavir 800 mG/cobicstat 150 mG 1 Tablet(s) Oral daily  enoxaparin Injectable 40 milliGRAM(s) SubCutaneous every 24 hours  lisinopril 5 milliGRAM(s) Oral daily  polyethylene glycol 3350 17 Gram(s) Oral daily  senna 2 Tablet(s) Oral at bedtime    MEDICATIONS  (PRN):  acetaminophen     Tablet .. 650 milliGRAM(s) Oral every 6 hours PRN Mild Pain (1 - 3)      Physical Exam :    General exam :  well built, not in distress, saturating well on room air. stuttering.   CVS : RRR no murmur, JVP not elevated  Lungs : good air entry bilaterally   Abdomen : BS present, soft, not tender, not distended.  Extremities: no edema, no tenderness.  Neuro : AAO x 3 -- right upper ext 4/5, right lower ext 3/5   Skin : warm and dry.   :  no meadows.      CBC Full  -  ( 2022 06:15 )  WBC Count : 3.93 K/uL  RBC Count : 5.77 M/uL  Hemoglobin : 15.2 g/dL  Hematocrit : 43.6 %  Platelet Count - Automated : 245 K/uL  Mean Cell Volume : 75.6 fl  Mean Cell Hemoglobin : 26.3 pg  Mean Cell Hemoglobin Concentration : 34.9 gm/dL  Auto Neutrophil # : 1.71 K/uL  Auto Lymphocyte # : 1.46 K/uL  Auto Monocyte # : 0.21 K/uL  Auto Eosinophil # : 0.38 K/uL  Auto Basophil # : 0.07 K/uL  Auto Neutrophil % : 43.4 %  Auto Lymphocyte % : 37.2 %  Auto Monocyte % : 5.3 %  Auto Eosinophil % : 9.7 %  Auto Basophil % : 1.8 %            139  |  101  |  13  ----------------------------<  97  4.9   |  30  |  0.90    Ca    10.0      2022 06:15  Phos  3.0     -  Mg     2.1     -    TPro  8.6<H>  /  Alb  4.2  /  TBili  0.6  /  DBili  x   /  AST  37  /  ALT  32  /  AlkPhos  63  11-26    Daily     Daily   CAPILLARY BLOOD GLUCOSE      POCT Blood Glucose.: 130 mg/dL (2022 14:23)      Urinalysis Basic - ( 2022 14:22 )    Color: Yellow / Appearance: Clear / S.010 / pH: x  Gluc: x / Ketone: NEGATIVE  / Bili: NEGATIVE / Urobili: 0.2 E.U./dL   Blood: x / Protein: NEGATIVE mg/dL / Nitrite: NEGATIVE   Leuk Esterase: NEGATIVE / RBC: x / WBC x   Sq Epi: x / Non Sq Epi: x / Bacteria: x        PT/INR - ( 2022 14:21 )   PT: 12.7 sec;   INR: 1.08          PTT - ( 2022 14:21 )  PTT:34.7 sec    imaging reviewed.  TTE, DAVID reviewed.

## 2022-11-27 NOTE — PROGRESS NOTE ADULT - ASSESSMENT
62 years old male with hx of HIV on antiviral, active Cocaine use, HTN on monotherapy, right-handed person ( PMD at [place] [address] )- presented to [facility] for right sided weakness- NIHSS 3 - MRI with acute infarct on Left basal ganglion, TTE/DAVID done on 11/25- non revealing   currently sinus on tele.     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    - tele reviewed- mostly in sinus,  - on DAPT   - high intensitiy high dose statin ( acute stroke )- atorvastatin 80 mg - LDL is 109 ( goal is < 70  - follow up neuro for ?  ILQ   - HTN -- permissive hypertension for acute stroke, to restart amlodipine at low dose when determined safe from neuro stand point.  - Not diabetic- hb a1c is 5.6   - HIV on Antiviral ( Biktarvy and Prezcobix - per patient was recent regime change by his doctor, has been on Antiviral for 15 yrs )   - smoking cessation, Cocaine use abstinence counselled, he is willing to quit.  - PT/OT evaluated- rec for Acute rehab   - dvt prophylasix.    Thank you for allowing to participate in the care, medicine comanagement will follow.

## 2022-11-27 NOTE — PROGRESS NOTE ADULT - SUBJECTIVE AND OBJECTIVE BOX
No acute events. Continues to have R shoulder pain (chronic).    Exam:  Improved R hemiparesis, mild drift of UE/LE. Speech remains dysarthric. Akithesia

## 2022-11-28 LAB
4/8 RATIO: 0.33 RATIO — LOW (ref 0.9–3.6)
ABS CD8: 580 /UL — SIGNIFICANT CHANGE UP (ref 142–740)
ANION GAP SERPL CALC-SCNC: 7 MMOL/L — SIGNIFICANT CHANGE UP (ref 5–17)
BLD GP AB SCN SERPL QL: NEGATIVE — SIGNIFICANT CHANGE UP
BUN SERPL-MCNC: 13 MG/DL — SIGNIFICANT CHANGE UP (ref 7–23)
CALCIUM SERPL-MCNC: 9.8 MG/DL — SIGNIFICANT CHANGE UP (ref 8.4–10.5)
CD3 BLASTS SPEC-ACNC: 71 % — SIGNIFICANT CHANGE UP (ref 59–83)
CD3 BLASTS SPEC-ACNC: 808 /UL — SIGNIFICANT CHANGE UP (ref 672–1870)
CD4 %: 17 % — LOW (ref 30–62)
CD8 %: 51 % — HIGH (ref 12–36)
CHLORIDE SERPL-SCNC: 104 MMOL/L — SIGNIFICANT CHANGE UP (ref 96–108)
CO2 SERPL-SCNC: 29 MMOL/L — SIGNIFICANT CHANGE UP (ref 22–31)
CREAT SERPL-MCNC: 0.91 MG/DL — SIGNIFICANT CHANGE UP (ref 0.5–1.3)
EGFR: 94 ML/MIN/1.73M2 — SIGNIFICANT CHANGE UP
GLUCOSE SERPL-MCNC: 90 MG/DL — SIGNIFICANT CHANGE UP (ref 70–99)
HCT VFR BLD CALC: 42.3 % — SIGNIFICANT CHANGE UP (ref 39–50)
HGB BLD-MCNC: 15 G/DL — SIGNIFICANT CHANGE UP (ref 13–17)
MAGNESIUM SERPL-MCNC: 2 MG/DL — SIGNIFICANT CHANGE UP (ref 1.6–2.6)
MCHC RBC-ENTMCNC: 26.3 PG — LOW (ref 27–34)
MCHC RBC-ENTMCNC: 35.5 GM/DL — SIGNIFICANT CHANGE UP (ref 32–36)
MCV RBC AUTO: 74.2 FL — LOW (ref 80–100)
NRBC # BLD: 0 /100 WBCS — SIGNIFICANT CHANGE UP (ref 0–0)
PHOSPHATE SERPL-MCNC: 2.5 MG/DL — SIGNIFICANT CHANGE UP (ref 2.5–4.5)
PLATELET # BLD AUTO: 223 K/UL — SIGNIFICANT CHANGE UP (ref 150–400)
POTASSIUM SERPL-MCNC: 4.4 MMOL/L — SIGNIFICANT CHANGE UP (ref 3.5–5.3)
POTASSIUM SERPL-SCNC: 4.4 MMOL/L — SIGNIFICANT CHANGE UP (ref 3.5–5.3)
RBC # BLD: 5.7 M/UL — SIGNIFICANT CHANGE UP (ref 4.2–5.8)
RBC # FLD: 14.1 % — SIGNIFICANT CHANGE UP (ref 10.3–14.5)
RH IG SCN BLD-IMP: POSITIVE — SIGNIFICANT CHANGE UP
SARS-COV-2 RNA SPEC QL NAA+PROBE: SIGNIFICANT CHANGE UP
SODIUM SERPL-SCNC: 140 MMOL/L — SIGNIFICANT CHANGE UP (ref 135–145)
T-CELL CD4 SUBSET PNL BLD: 192 /UL — LOW (ref 489–1457)
WBC # BLD: 3.25 K/UL — LOW (ref 3.8–10.5)
WBC # FLD AUTO: 3.25 K/UL — LOW (ref 3.8–10.5)

## 2022-11-28 PROCEDURE — 99232 SBSQ HOSP IP/OBS MODERATE 35: CPT

## 2022-11-28 PROCEDURE — 99221 1ST HOSP IP/OBS SF/LOW 40: CPT

## 2022-11-28 RX ORDER — ATORVASTATIN CALCIUM 80 MG/1
20 TABLET, FILM COATED ORAL AT BEDTIME
Refills: 0 | Status: DISCONTINUED | OUTPATIENT
Start: 2022-11-28 | End: 2022-11-30

## 2022-11-28 RX ADMIN — Medication 650 MILLIGRAM(S): at 17:22

## 2022-11-28 RX ADMIN — DARUNAVIR ETHANOLATE AND COBICISTAT 1 TABLET(S): 800; 150 TABLET, FILM COATED ORAL at 14:00

## 2022-11-28 RX ADMIN — CLOPIDOGREL BISULFATE 75 MILLIGRAM(S): 75 TABLET, FILM COATED ORAL at 11:24

## 2022-11-28 RX ADMIN — ATORVASTATIN CALCIUM 20 MILLIGRAM(S): 80 TABLET, FILM COATED ORAL at 22:13

## 2022-11-28 RX ADMIN — BICTEGRAVIR SODIUM, EMTRICITABINE, AND TENOFOVIR ALAFENAMIDE FUMARATE 1 TABLET(S): 30; 120; 15 TABLET ORAL at 11:25

## 2022-11-28 RX ADMIN — Medication 81 MILLIGRAM(S): at 11:26

## 2022-11-28 RX ADMIN — Medication 1 TABLET(S): at 17:21

## 2022-11-28 RX ADMIN — Medication 650 MILLIGRAM(S): at 18:20

## 2022-11-28 RX ADMIN — LISINOPRIL 5 MILLIGRAM(S): 2.5 TABLET ORAL at 05:14

## 2022-11-28 RX ADMIN — LIDOCAINE 1 PATCH: 4 CREAM TOPICAL at 23:00

## 2022-11-28 RX ADMIN — ENOXAPARIN SODIUM 40 MILLIGRAM(S): 100 INJECTION SUBCUTANEOUS at 17:22

## 2022-11-28 RX ADMIN — LIDOCAINE 1 PATCH: 4 CREAM TOPICAL at 11:23

## 2022-11-28 RX ADMIN — LIDOCAINE 1 PATCH: 4 CREAM TOPICAL at 06:17

## 2022-11-28 RX ADMIN — POLYETHYLENE GLYCOL 3350 17 GRAM(S): 17 POWDER, FOR SOLUTION ORAL at 11:25

## 2022-11-28 RX ADMIN — SENNA PLUS 2 TABLET(S): 8.6 TABLET ORAL at 22:13

## 2022-11-28 NOTE — PROGRESS NOTE ADULT - ASSESSMENT
per Internal Medicine    62 years old male with hx of HIV on antiviral, active Cocaine use, HTN on monotherapy, right-handed person ( PMD at [place] [address] )- presented to [facility] for right sided weakness- NIHSS 3 - MRI with acute infarct on Left basal ganglion, TTE/DAVID done on 11/25- non revealing   currently sinus on tele.     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    - tele reviewed- mostly in sinus,  - on DAPT   - high intensitiy high dose statin ( acute stroke )- atorvastatin 80 mg - LDL is 109 ( goal is < 70  - follow up neuro for ?  ILQ   - HTN -- permissive hypertension for acute stroke, to restart amlodipine at low dose when determined safe from neuro stand point.  - Not diabetic- hb a1c is 5.6   - HIV on Antiviral ( Biktarvy and Prezcobix - per patient was recent regime change by his doctor, has been on Antiviral for 15 yrs )   - smoking cessation, Cocaine use abstinence counselled, he is willing to quit.  - PM&R /PT/OT evaluated- rec for Acute rehab   - dvt prophylasix.

## 2022-11-28 NOTE — CONSULT NOTE ADULT - SUBJECTIVE AND OBJECTIVE BOX
HPI:  62 year old Male with HIV, HTN, Polysubstance use(cocaine use 11/20), presented to [facility] with a CVA.  EP called for ILR implant.    He denies any history of arrhythmias or heart issues in the past.  No bleeding disorders, thyroid issues or prior CVA events.  He was admitted with R sided weakness, unsteady gait and stuttering speech. He was found to have a CVA.  EP called for ILR implant    PAST MEDICAL & SURGICAL HISTORY:  HIV (human immunodeficiency virus infection)  HTN (hypertension)      Social History: + cocaine, former smoker, + alcohol       Inpatient Medications:   acetaminophen     Tablet .. 650 milliGRAM(s) Oral every 6 hours PRN  aspirin enteric coated 81 milliGRAM(s) Oral daily  atorvastatin 80 milliGRAM(s) Oral at bedtime  bictegravir 50 mG/emtricitabine 200 mG/tenofovir alafenamide 25 mG (BIKTARVY) 1 Tablet(s) Oral daily  clopidogrel Tablet 75 milliGRAM(s) Oral daily  darunavir 800 mG/cobicstat 150 mG 1 Tablet(s) Oral daily  enoxaparin Injectable 40 milliGRAM(s) SubCutaneous every 24 hours  lidocaine   4% Patch 1 Patch Transdermal daily  lisinopril 5 milliGRAM(s) Oral daily  polyethylene glycol 3350 17 Gram(s) Oral daily  senna 2 Tablet(s) Oral at bedtime      Allergies: No Known Allergies      ROS:   CONSTITUTIONAL: No fever, weight loss    EYES: Pt denies  RESPIRATORY: No cough, wheezing, chills or hemoptysis; No Shortness of Breath  CARDIOVASCULAR: see HPI  GASTROINTESTINAL: Pt denies  NEUROLOGICAL: states R sided weakness has improved   SKIN: Pt denies   PSYCHIATRIC: Pt denies  HEME/LYMPH: Pt denies    PHYSICAL:  T(C): 36.7 (11-28-22 @ 11:56), Max: 36.9 (11-28-22 @ 05:06)  HR: 64 (11-28-22 @ 11:56) (63 - 67)  BP: 147/80 (11-28-22 @ 11:56) (128/77 - 147/80)  RR: 18 (11-28-22 @ 11:56) (16 - 18)  SpO2: 99% (11-28-22 @ 11:56) (97% - 99%)    Appearance: No acute distress, well developed  Eyes: normal appearing conjunctiva, pupils and eyelids  Cardiovascular: Normal S1 S2, No JVD  Respiratory: Lungs clear to auscultation  Gastrointestinal:  Soft  Neurologic:  No deficit noted  Psych: A&Ox3, normal mood/affect  Musculoskeletal: no deformity   Skin: no rash noted, normal color and pigmentation.        LABS:                        15.0   3.25  )-----------( 223      ( 28 Nov 2022 05:30 )             42.3     140  |  104  |  13  ----------------------------<  90  4.4   |  29  |  0.91    Ca    9.8     Phos  2.5     Mg     2.0     TSH 1.77     EKG: In ER sinus at 76    Telemetry: none    ECHO:  from: DAVID w/Doppler (11.25.22 @ 15:41) >     1. No LA/RA/ZOILA/RAA thrombus seen.   2. The interatrial septum appears intact. Injection of agitated saline via a peripheral vein reveals no evidence of a right-to-left shunt.   3. Normal left ventricular size and systolic function.   4. Normal right ventricular size and systolic function.   5. No significant valvular disease.   6. There is mild non-mobile plaque seen in the visualized portion of the descending aorta. There is mild non-mobile plaque seen in the visualized   portion of the aortic arch.   7. No pericardial effusion.     < from: Echocardiogram w/ Bubble and Doppler (11.25.22 @ 15:41) >   1. Mild symmetric left ventricular hypertrophy.   2. Normal left ventricular size and systolic function.   3. Normal right ventricular size and systolic function.   4. No significant valvular disease.   5. The interatrial septum appears intact. Injection of agitated saline via a peripheral vein reveals no evidence of a right-to-left shunt.   6. No pericardial effusion.         Assessment Plan:  62 year old Male with HIV, HTN, Polysubstance use(cocaine use 11/20), presented to [facility] with a CVA.  EP called for ILR implant.  We discussed that there are various reasons for stroke one being atrial fibrillation.  We discussed the what atrial fibrillation is and that this arrhythmia can be paroxysmal in nature. Neurology has recommended an ILR implant which he is amenable to.  We will arrange to to this 11/29.  No need to be NPO and he can be discharged to rehab after it is placed      
Orthopaedic Surgery Consult Note    Attending Physician: Liyah    CC: R shoulder pain    HPI:  HPI: 62y Male with PMHx of HIV, HTN, Polysubstance use(Heroine, cocaine use years ago), but admits to recent cocaine use last sunday (11/20), presented to [facility] after waking up on 11/24 at 0800 with R sided weakness, unsteady gait and stuttering speech. Per pt went to bed on 11[place]23 @ around 9[place] [facility] [place] woke up the next day (11/24) 8am and felt he cannot move the right side of his body, was lying in bed approximately for 2-3hrs and around 11am was able to move his RUE/RLE a bit better than before,  but still weak, called EMS and was brought to [facility] ER. CTH with no acute intracranial pathology, CTA H/N with no LVO/HGS and CTP with prolonged Tmax in L temporal lobe.     Of note, patient endorses R shoulder pain that is similar in quality and severity before current admission. He endorses new RUE weakness since the stroke but overall he feels his strength is slowly improving and is much better than when he experienced the stroke. Denies numbness/tingling of affected extremity.    Allergies    No Known Allergies    Intolerances      PAST MEDICAL & SURGICAL HISTORY:  HIV (human immunodeficiency virus infection)      HTN (hypertension)          Meds:  acetaminophen     Tablet .. 650 milliGRAM(s) Oral every 6 hours PRN  aspirin enteric coated 81 milliGRAM(s) Oral daily  atorvastatin 20 milliGRAM(s) Oral at bedtime  bictegravir 50 mG/emtricitabine 200 mG/tenofovir alafenamide 25 mG (BIKTARVY) 1 Tablet(s) Oral daily  clopidogrel Tablet 75 milliGRAM(s) Oral daily  darunavir 800 mG/cobicstat 150 mG 1 Tablet(s) Oral daily  enoxaparin Injectable 40 milliGRAM(s) SubCutaneous every 24 hours  lidocaine   4% Patch 1 Patch Transdermal daily  lisinopril 5 milliGRAM(s) Oral daily  polyethylene glycol 3350 17 Gram(s) Oral daily  senna 2 Tablet(s) Oral at bedtime  trimethoprim  160 mG/sulfamethoxazole 800 mG 1 Tablet(s) Oral daily      Family History:  Denies family history of bleeding disorders    Social History:   Pt is a nonsmoker  Social EtOH use     Review of Systems:  All review of systems are negative except for those mentioned in HPI.    Physical Exam:  General: Pt Alert and oriented, NAD  R shoulder w/ TTP over ACJ, bicipital groove and posterior shoulder.  FF 60, ABd 40, ER 30, IR to T10  Pulses: 2+ radial pulses, fingers wwp, cap refill <3 seconds  Sensation: SILT axillary/radial/median/ulnar distributions  Motor: 4/5 RUE flexion, abd, ER, biceps, triceps, wrist ext/flex,  strength (overall improving since stroke code was called, pain unchanged to preop exam)  +empty can  + hornblowers  impingement signs    Vital Signs Last 24 Hrs  T(C): 36.7 (28 Nov 2022 11:56), Max: 36.9 (28 Nov 2022 05:06)  T(F): 98 (28 Nov 2022 11:56), Max: 98.4 (28 Nov 2022 05:06)  HR: 64 (28 Nov 2022 11:56) (63 - 67)  BP: 147/80 (28 Nov 2022 11:56) (128/77 - 147/80)  BP(mean): --  RR: 18 (28 Nov 2022 11:56) (16 - 18)  SpO2: 99% (28 Nov 2022 11:56) (97% - 99%)    Parameters below as of 28 Nov 2022 11:56  Patient On (Oxygen Delivery Method): room air          Labs:                        15.0   3.25  )-----------( 223      ( 28 Nov 2022 05:30 )             42.3     11-28    140  |  104  |  13  ----------------------------<  90  4.4   |  29  |  0.91    Ca    9.8      28 Nov 2022 05:30  Phos  2.5     11-28  Mg     2.0     11-28        A/P: 62yMale admitted for treatment of L cerebral infarct w/ residual RUE/RLE weakness and known R RCT (schedule for outpatient arthroscopic RCR) p/w w/ unchanged R shoulder pain since stroke. Primary team contacted ortho to determine if residual weakness due to stroke or RCT. Ortho unable to comment bc his preop RUE exam is unknown.  - pain control  - obtain non urgent Xray of R shoulder  - recommend reaching out to Dr. Mcintosh at [facility] for collateral information about R shoulder exam and rads reports  - WBAT  - no orthopaedic intervention during this admission  - recommend following up with Dr. Mcintosh outpatient  - Discussed with Attending, Dr. Liyah Montalvo, PGY-2  Ortho Pager [phone]  
  Patient is a 62y old  Male who presents with a chief complaint of right sided weakness (2022 18:18)    HPI:   STROKE HPI    HPI: 62y Male with PMHx of HIV, HTN, Polysubstance use(Heroine, cocaine use years ago), but admits to recent cocaine use last  (), presented to [facility] after waking up on  at 0800 with R sided weakness, unsteady gait and stuttering speech. Per pt went to bed on  [place] around 9[place] [facility] [place] woke up the next day () 8am and felt he cannot move the right side of his body, was lying in bed approximately for 2-3hrs and around 11am was able to move his RUE/RLE a bit better than before,  but still weak, called EMS and was brought to [facility] ER. CTH with no acute intracranial pathology, CTA H/N with no LVO/HGS and CTP with prolonged Tmax in L temporal lobe. Case D/W Dr. Segal, pt not a candidate for any acute intervention.     (2022 18:18)    Review of Systems: 12 point review of systems otherwise negative    PAST MEDICAL & SURGICAL HISTORY:  HIV (human immunodeficiency virus infection)      HTN (hypertension)    Social History:  SOCIAL HISTORY:   Patient lives by himself  Smoking status: Ex smoker, Used to smoke 1pack/day of cigarettes, Quit X 2months ago  Drinking: Social drinker, only drinks when he meets his friends/ foot ball games, admits to drinking a pint of vodka last  () while watching foot ball game.  Drug Use: Has used Heroin and cocaine in the past, admits to using cocaine last () (2022 18:18)    FAMILY HISTORY:  father had a stroke    MEDICATIONS  (STANDING):  aspirin enteric coated 81 milliGRAM(s) Oral daily  atorvastatin 80 milliGRAM(s) Oral at bedtime  clopidogrel Tablet 75 milliGRAM(s) Oral daily  enoxaparin Injectable 40 milliGRAM(s) SubCutaneous every 24 hours    MEDICATIONS  (PRN):  acetaminophen     Tablet .. 650 milliGRAM(s) Oral every 6 hours PRN Mild Pain (1 - 3)      Allergies    No Known Allergies    Intolerances          Vital Signs Last 24 Hrs  T(C): 36.3 (2022 13:31), Max: 36.7 (2022 17:09)  T(F): 97.4 (2022 13:31), Max: 98 (2022 17:09)  HR: 64 (2022 12:30) (54 - 76)  BP: 142/75 (2022 12:30) (137/63 - 167/92)  BP(mean): 102 (2022 12:30) (91 - 128)  RR: 18 (2022 12:30) (16 - 18)  SpO2: 96% (2022 12:30) (93% - 99%)    Parameters below as of 2022 12:30  Patient On (Oxygen Delivery Method): room air      CAPILLARY BLOOD GLUCOSE           @ 07:01  -   @ 07:00  --------------------------------------------------------  IN: 300 mL / OUT: 1600 mL / NET: -1300 mL        Physical Exam:  (earlier today)  Daily     Daily   General:  well-appearing in NAD  HEENT:  MMM ?facial droop  CV:  RRR  Lungs:  CTA B/L  Abdomen:  soft NT ND  Extremities:  no edema B/L LE  Skin:  WWP  Neuro:  AAOx3, +dysarthria 3+/5 strength RUE    LABS:                        14.7   3.58  )-----------( 221      ( 2022 05:30 )             41.3     11-25    138  |  102  |  15  ----------------------------<  87  4.5   |  29  |  0.89    Ca    10.0      2022 05:30  Phos  2.6     11  Mg     2.1         TPro  9.1<H>  /  Alb  3.6  /  TBili  0.3  /  DBili  x   /  AST  49<H>  /  ALT  51<H>  /  AlkPhos  66  11-24    PT/INR - ( 2022 14:21 )   PT: 12.7 sec;   INR: 1.08          PTT - ( 2022 14:21 )  PTT:34.7 sec  Urinalysis Basic - ( 2022 14:22 )    Color: Yellow / Appearance: Clear / S.010 / pH: x  Gluc: x / Ketone: NEGATIVE  / Bili: NEGATIVE / Urobili: 0.2 E.U./dL   Blood: x / Protein: NEGATIVE mg/dL / Nitrite: NEGATIVE   Leuk Esterase: NEGATIVE / RBC: x / WBC x   Sq Epi: x / Non Sq Epi: x / Bacteria: x    
    Patient is a 62y old  Male who presents with a chief complaint of right sided weakness (2022 09:23)        HPI:   STROKE HPI    HPI: 62y Male with PMHx of HIV, HTN, Polysubstance use(Heroine, cocaine use years ago), but admits to recent cocaine use last  (), presented to [facility] after waking up on  at 0800 with R sided weakness, unsteady gait and stuttering speech. Per [place] [facility] [place] day () 8am and felt he cannot move the right side of his body, was lying in bed approximately for 2-3hrs and around 11am was able to move his RUE/RLE a bit better than before,  but still weak, called EMS and was brought to [facility] ER. CTH with no acute intracranial pathology, CTA H/N with no LVO/HGS and CTP with prolonged Tmax in L temporal lobe. Case D/W Dr. Segal, pt not a candidate for any acute intervention.    PAST MEDICAL & SURGICAL HISTORY:  HIV (human immunodeficiency virus infection)      HTN (hypertension)          MEDICATIONS  (STANDING):  aspirin enteric coated 81 milliGRAM(s) Oral daily  atorvastatin 80 milliGRAM(s) Oral at bedtime  clopidogrel Tablet 75 milliGRAM(s) Oral daily  enoxaparin Injectable 40 milliGRAM(s) SubCutaneous every 24 hours  polyethylene glycol 3350 17 Gram(s) Oral daily  senna 2 Tablet(s) Oral at bedtime    MEDICATIONS  (PRN):  acetaminophen     Tablet .. 650 milliGRAM(s) Oral every 6 hours PRN Mild Pain (1 - 3)        Social History:                   -  Home Living Status :  lives alone in an elevator accessible apartment building            -  Prior Home Care Services :  none     Baseline Functional Level Prior to Admission :             - ADL's/ IADL's :  independent            - Ambulatory status prior to admission :   walked with a cane       FAMILY HISTORY:    CBC Full  -  ( 2022 06:15 )  WBC Count : 3.93 K/uL  RBC Count : 5.77 M/uL  Hemoglobin : 15.2 g/dL  Hematocrit : 43.6 %  Platelet Count - Automated : 245 K/uL  Mean Cell Volume : 75.6 fl  Mean Cell Hemoglobin : 26.3 pg  Mean Cell Hemoglobin Concentration : 34.9 gm/dL  Auto Neutrophil # : 1.71 K/uL  Auto Lymphocyte # : 1.46 K/uL  Auto Monocyte # : 0.21 K/uL  Auto Eosinophil # : 0.38 K/uL  Auto Basophil # : 0.07 K/uL  Auto Neutrophil % : 43.4 %  Auto Lymphocyte % : 37.2 %  Auto Monocyte % : 5.3 %  Auto Eosinophil % : 9.7 %  Auto Basophil % : 1.8 %          139  |  101  |  13  ----------------------------<  97  4.9   |  30  |  0.90    Ca    10.0      2022 06:15  Phos  3.0       Mg     2.1         TPro  8.6<H>  /  Alb  4.2  /  TBili  0.6  /  DBili  x   /  AST  37  /  ALT  32  /  AlkPhos  63        Urinalysis Basic - ( 2022 14:22 )    Color: Yellow / Appearance: Clear / S.010 / pH: x  Gluc: x / Ketone: NEGATIVE  / Bili: NEGATIVE / Urobili: 0.2 E.U./dL   Blood: x / Protein: NEGATIVE mg/dL / Nitrite: NEGATIVE   Leuk Esterase: NEGATIVE / RBC: x / WBC x   Sq Epi: x / Non Sq Epi: x / Bacteria: x          Radiology :     < from: MR Head No Cont (22 @ 08:56) >  ACC: [id number] EXAM:  MR BRAIN                          PROCEDURE DATE:  2022          INTERPRETATION:  PROCEDURE: MRI Brain without contrast    INDICATION: Right-sided weakness.    TECHNIQUE: Sagittal T1, axial T1, T2-FLAIR, diffusion, GRE and coronal   T2-FLAIR images of the brain were obtained.    COMPARISON: CT head 2022    FINDINGS: Examination is degraded by motion. The ventricles, cisternal   spaces, and cortical sulci to be appropriate for the patient's stated   age. There isno midline shift or extra-axial collection.    There is focal restricted diffusion in the left basal ganglia consistent   with recent infarction. There is associated FLAIR hyperintense signal.   There is no associated hemorrhage. There are patchy andconfluent areas   of T2/FLAIR hyperintense signal within the periventricular subcortical   white matter.    The gradient echo images demonstrate no parenchymal blood products.    Bilateral maxillary sinus polyp/retention cyst.. The mastoid air cells   are well-aerated.    IMPRESSION:    Recent infarction in the left basal ganglia. No significant mass effect.   No evidence of hemorrhage.          Vital Signs Last 24 Hrs  T(C): 36.8 (2022 10:00), Max: 36.8 (2022 22:00)  T(F): 98.3 (2022 10:00), Max: 98.3 (2022 22:00)  HR: 66 (2022 08:42) (56 - 70)  BP: 165/89 (2022 08:42) (118/60 - 167/92)  BP(mean): 121 (2022 08:42) (81 - 122)  RR: 18 (2022 08:42) (17 - 18)  SpO2: 97% (2022 08:42) (93% - 98%)    Parameters below as of 2022 08:42  Patient On (Oxygen Delivery Method): room air            REVIEW OF SYSTEMS:      CONSTITUTIONAL: No fever, weight loss, or fatigue  EYES: No eye pain, visual disturbances, or discharge  ENMT:  No difficulty hearing, tinnitus, vertigo; No sinus or throat pain  NECK: No pain or stiffness  BREASTS: No pain, masses, or nipple discharge  RESPIRATORY: No cough, wheezing, chills or hemoptysis; No shortness of breath  CARDIOVASCULAR: No chest pain, palpitations, dizziness, or leg swelling  GASTROINTESTINAL: No abdominal or epigastric pain. No nausea, vomiting, or hematemesis; No diarrhea or constipation. No melena or hematochezia.  GENITOURINARY: No dysuria, frequency, hematuria, or incontinence  NEUROLOGICAL: per hpi   SKIN: No itching, burning, rashes, or lesions   LYMPH NODES: No enlarged glands  ENDOCRINE: No heat or cold intolerance; No hair loss  MUSCULOSKELETAL: No joint pain or swelling; No muscle, back, or extremity pain  PSYCHIATRIC: No depression, anxiety, mood swings, or difficulty sleeping  HEME/LYMPH: No easy bruising, or bleeding gums  ALLERGY AND IMMUNOLOGIC: No hives or eczema  VASCULAR: no swelling , erythema        Physical Exam:  62 y o man lying comfortably in semi Gonzalez's position , awake , alert , no current complaints     Head : normocephalic , atraumatic    Eyes : PERRLA , EOMI , no nystagmus , sclera anicteric    ENT : nasal discharge , uvula midline , no oropharyngeal erythema / exudate    Neck : supple , negative JVD , negative carotid bruits , no thyromegaly    Chest : CTA bilaterally , neg wheeze / rhonchi / rales / crackles / egophany    Cardiovascular: regular rate and rhythm , neg murmurs / rubs / gallops    Abdomen : soft , non distended , non tender to palpation in all 4 quadrants , negative rebound / guarding , normal bowel sounds    Extremities : WWP , neg cyanosis /clubbing / edema , negative calf tenderness to palpation , negative Johnnie's sign    Musculoskeletal :  R shoulder pain with AROM      Neurologic Exam:    Alert and oriented to person , place , date/year, speech fluent w/o dysarthria , intermittently stutters , follows commands , recent and remote memory intact , repetition intact , comprehension intact ,  attention/concentration intact , fund of knowledge appropriate    II :                         pupils equal , round and reactive to light , visual fields intact   III/ IV/VI :              extraocular movements intact , neg nystagmus , neg ptosis  V :                        facial sensation intact , V1-3 normal  VII :                     R nasolabial flattening, normal eye closure and smile  VIII :                     hearing intact to finger rub bilaterally  IX and X :             no hoarseness , gag intact , palate/ uvula rise symmetrically  XI :                       SCM / trapezius strength intact bilateral  XII :                      no tongue deviation    Motor Exam:          Right UE:                   3-/5 :    4/5 : wrist extensors/ flexors   5/5 : biceps                  5/5 : triceps  4/5 : deltoid sec to pain     pos pronator drift                               Left UE:          5/5 :    5/5 : wrist extensors/ flexors   5/5 : biceps                  5/5 : triceps  5/5 : deltoid     negative pronator drift        Right LE:                5/5 : dorsiflexors   5/5 : plantar flexors  5/5 : quadriceps  5/5 : hamstrings  5/5 : hip flexors    Left LE:                  5/5 : dorsiflexors   5/5 : plantar flexors  5/5 : quadriceps  5/5 : hamstrings  5/5 : hip flexors        Sensation:         intact to light touch x 4 extremities                         no neglect or extinction on double simultaneous testing                       DTR :                     biceps/brachioradialis : equal                                              patella/ankle : equal                                                                               neg Babinski        Coordination :      Finger to Nose :  neg dysmetria bilaterally      Gait :  not tested        PM&R Impression :     1) admitted for c/o r sided weakness unsteady gait, stuttering speech    2) MRI revealed recent left basal ganglia infarct      Recommendations / Plan :     1) Physical / Occupational therapy focusing on therapeutic exercises , equipment evaluation , bed mobility/transfer out of bed evaluation , progressive ambulation with assistive devices prn .    2) Current disposition plan recommendation  :    acute rehab placement

## 2022-11-28 NOTE — PROGRESS NOTE ADULT - PROBLEM SELECTOR PLAN 5
Home amlodipine 10mg, lisinopril 20mg  - hold home bp meds for permissive hypertension, Goal -180  - TTE with bubble performed- negative  - Stroke Code EKG Results: F/U official read.

## 2022-11-28 NOTE — PROGRESS NOTE ADULT - PROBLEM SELECTOR PLAN 3
pt c/o feeling constipated. Reports last BM 4 days ago. Currently on miralax and senna.    -given laculose 10g 1x  -monitor BM

## 2022-11-28 NOTE — PROGRESS NOTE ADULT - SUBJECTIVE AND OBJECTIVE BOX
Physical Medicine and Rehabilitation Progress Note :       Patient is a 62y old  Male who presents with a chief complaint of right sided weakness (27 Nov 2022 15:31)      HPI:   STROKE HPI    HPI: 62y Male with PMHx of HIV, HTN, Polysubstance use(Heroine, cocaine use years ago), but admits to recent cocaine use last sunday (11/20), presented to [facility] after waking up on 11/24 at 0800 with R sided weakness, unsteady gait and stuttering speech. Per pt went to bed on 11/23 @ around 9pm in [facility], woke up the next day (11/24) 8am and felt he cannot move the right side of his body, was lying in bed approximately for 2-3hrs and around 11am was able to move his RUE/RLE a bit better than before,  but still weak, called EMS and was brought to [facility] ER. CTH with no acute intracranial pathology, CTA H/N with no LVO/HGS and CTP with prolonged Tmax in L temporal lobe. Case D/W Dr. Segal, pt not a candidate for any acute intervention.    ASSESSMENT/PLAN    62y Male w/ PMH ***. HCT showed ***. CTA showed ***. Given *** tPA was ***. Patient was admitted to **** for further workup   (24 Nov 2022 18:18)                            15.0   3.25  )-----------( 223      ( 28 Nov 2022 05:30 )             42.3       11-28    140  |  104  |  13  ----------------------------<  90  4.4   |  29  |  0.91    Ca    9.8      28 Nov 2022 05:30  Phos  2.5     11-28  Mg     2.0     11-28      Vital Signs Last 24 Hrs  T(C): 36.7 (28 Nov 2022 11:56), Max: 36.9 (28 Nov 2022 05:06)  T(F): 98 (28 Nov 2022 11:56), Max: 98.4 (28 Nov 2022 05:06)  HR: 64 (28 Nov 2022 11:56) (63 - 67)  BP: 147/80 (28 Nov 2022 11:56) (128/77 - 147/80)  BP(mean): --  RR: 18 (28 Nov 2022 11:56) (16 - 18)  SpO2: 99% (28 Nov 2022 11:56) (97% - 99%)    Parameters below as of 28 Nov 2022 11:56  Patient On (Oxygen Delivery Method): room air        MEDICATIONS  (STANDING):  aspirin enteric coated 81 milliGRAM(s) Oral daily  atorvastatin 80 milliGRAM(s) Oral at bedtime  bictegravir 50 mG/emtricitabine 200 mG/tenofovir alafenamide 25 mG (BIKTARVY) 1 Tablet(s) Oral daily  clopidogrel Tablet 75 milliGRAM(s) Oral daily  darunavir 800 mG/cobicstat 150 mG 1 Tablet(s) Oral daily  enoxaparin Injectable 40 milliGRAM(s) SubCutaneous every 24 hours  lidocaine   4% Patch 1 Patch Transdermal daily  lisinopril 5 milliGRAM(s) Oral daily  polyethylene glycol 3350 17 Gram(s) Oral daily  senna 2 Tablet(s) Oral at bedtime    MEDICATIONS  (PRN):  acetaminophen     Tablet .. 650 milliGRAM(s) Oral every 6 hours PRN Mild Pain (1 - 3)       Initial Occupational Therapy Functional Status Assessment :       Previous Level of Function:     · Bed Mobility/Transfers	independent  · Bathing	independent  · Upper Body Dressing	independent  · Lower Body Dressing	independent  · Grooming	independent  · Toileting	independent  · Eating	independent  · Home Management Skills	independent  · Additional Comments	Pt lives alone in apartment with elevator access and no PHILLIP. Prior to admission pt used a cane as needed for ambulation 2/2 R LE weakness, however performed all ADLs independently and w/o use of further AD/AE.    · Ambulatory Devices Needed	yes  cane  · Adaptive Equipment Needed	no    Cognitive Status Examination:   · Level of Consciousness	alert  · Orientation	oriented to person, place, time and situation  · Follow Commands/Answers Questions	100% of the time  · Personal Safety and Judgment	intact    Range of Motion Exam:   · Range of Motion Examination, Upper Extremity	Left UE Active ROM was WFL (within functional limits); R UE shoulder flexion limited to ~90 deg AROM  · Range of Motion Examination, Lower Extremity	bilateral LE Active ROM was WFL  (within functional limits)    Extremity Manual Muscle Testing:     · Right Upper Extremity	shoulder 3-/5 for flex/ext/abd; elbow/wrist/ strength 5/5  · Left Upper Extremity	5/5 for all planes of motion  · Right Lower Extremity	hip/knee 4/5; ankle 5/5  · Left Lower Extremity	5/5 for all planes of motion    Muscle Tone Assessment:   · Muscle Tone Assessment	normal    Bed Mobility: Rolling/Turning:     · Level of [place]	supervision    Bed Mobility: Scooting/Bridging:     · Level of [place]	supervision    Bed Mobility: Sit to Supine:     · Level of [place]	supervision    Bed Mobility: Supine to Sit:     · Level of [place]	supervision    Bed Mobility Analysis:     · Impairments Contributing to Impaired Bed Mobility	impaired balance; decreased flexibility; impaired postural control; decreased strength    Transfer: Sit to Stand:     · Level of [place]	stand-by assist  · Physical Assist/Nonphysical Assist	1 person assist; verbal cues  · Assistive Device	rolling walker    Transfer: Stand to Sit:     · Level of [place]	stand-by assist  · Physical Assist/Nonphysical Assist	1 person assist; verbal cues  · Assistive Device	rolling walker    Sit/Stand Transfer Safety Analysis:     · Transfer Safety Concerns Noted	decreased weight-shifting ability  · Impairments Contributing to Impaired Transfers	decreased strength; impaired postural control; decreased flexibility; impaired balance    Transfer: Toilet Transfer:     · Level of [place]	stand-by assist  · Physical Assist/Nonphysical Assist	1 person assist; verbal cues  · Assistive Device	rolling walker    Toilet Transfer Safety Analysis:     · Transfer Safety Concerns Noted	decreased weight-shifting ability  · Impairments Contributing to Impaired Transfers	decreased strength; impaired postural control; impaired balance; decreased flexibility    Balance Skills Assessment:     · Sitting Balance: Static	good balance  · Sitting Balance: Dynamic	good balance  · Sit-to-Stand Balance	good balance  · Standing Balance: Static	good balance  · Standing Balance: Dynamic	good minus  · Systems Impairment Contributing to Balance Disturbance	neuromuscular; musculoskeletal  · Identified Impairments Contributing to Balance Disturbance	decreased strength; impaired postural control; impaired motor control    Sensory Examination:     Grossly Intact:   · Gross Sensory Examination	Grossly Intact      Light Touch Sensation:   · Left UE	within normal limits  · Right UE	within normal limits  · Left LE	within normal limits  · Right LE	within normal limits      Fine Motor Coordination:     Fine Motor Coordination:   · Left Hand, Finger to Nose	normal performance  · Right Hand, Finger to Nose	normal performance  · Left Hand Thumb/Finger Opposition Skills	normal performance  · Right Hand Thumb/Finger Opposition Skills	normal performance  · Left Hand, Diadochokinesis Skills	normal performance  · Right Hand, Diadochokinesis Skills	normal performance    Lower Body Dressing Training:     · Level of [place]	stand-by assist; don/doff b/l socks sitting EOB  · Physical Assist/Nonphysical Assist	1 person assist; verbal cues    Treatment Locations:   · Comments	Pt able to ambulate in room/hallway with SBAx1 using RW, demo impaired ability to weight shift 2/2 R LE weakness/decreased motor control, contributing to overall balance impairment.// Cranial Nerves II - XII: II: PERRLA; visual fields are full to confrontation III, IV, VI: EOMI, no nystagmus appreciated V: facial sensation intact to light touch V1-V3 b/l VII: no ptosis, no facial droop, symmetric eyebrow raise and closure VIII: hearing intact to finger rub b/l  XI: head turning and shoulder shrug intact b/l XII: tongue protrusion midline // Pt is R hand dominant    Clinical Impression:   · Rehab Potential	good, to achieve stated therapy goals  · Therapy Frequency	2-3x/week          PM&R Impression : as above    Current Disposition Plan Recommendations :    acute rehab placement

## 2022-11-28 NOTE — PROGRESS NOTE ADULT - SUBJECTIVE AND OBJECTIVE BOX
OVERNIGHT EVENTS:    SUBJECTIVE / INTERVAL HPI: Patient seen and examined at bedside.     VITAL SIGNS:  Vital Signs Last 24 Hrs  T(C): 36.7 (28 Nov 2022 11:56), Max: 36.9 (28 Nov 2022 05:06)  T(F): 98 (28 Nov 2022 11:56), Max: 98.4 (28 Nov 2022 05:06)  HR: 64 (28 Nov 2022 11:56) (63 - 67)  BP: 147/80 (28 Nov 2022 11:56) (128/77 - 147/80)  BP(mean): --  RR: 18 (28 Nov 2022 11:56) (16 - 18)  SpO2: 99% (28 Nov 2022 11:56) (97% - 99%)    Parameters below as of 28 Nov 2022 11:56  Patient On (Oxygen Delivery Method): room air        PHYSICAL EXAM:    General: WDWN  HEENT: NCAT; PERRL, anicteric sclera; MMM  Neck: supple, trachea midline  Cardiovascular: S1, S2 normal; RRR, no M/G/R  Respiratory: CTABL; no W/R/R  Gastrointestinal: soft, nontender, nondistended. bowel sounds present.  Skin: no ulcerations or visible rashes appreciated  Extremities: WWP; no edema, clubbing or cyanosis  Vascular: 2+ radial, DP/PT pulses B/L  Neurological: AAOx3; CN II-XII grossly intact; no focal deficits    MEDICATIONS:  MEDICATIONS  (STANDING):  aspirin enteric coated 81 milliGRAM(s) Oral daily  atorvastatin 80 milliGRAM(s) Oral at bedtime  bictegravir 50 mG/emtricitabine 200 mG/tenofovir alafenamide 25 mG (BIKTARVY) 1 Tablet(s) Oral daily  clopidogrel Tablet 75 milliGRAM(s) Oral daily  darunavir 800 mG/cobicstat 150 mG 1 Tablet(s) Oral daily  enoxaparin Injectable 40 milliGRAM(s) SubCutaneous every 24 hours  lidocaine   4% Patch 1 Patch Transdermal daily  lisinopril 5 milliGRAM(s) Oral daily  polyethylene glycol 3350 17 Gram(s) Oral daily  senna 2 Tablet(s) Oral at bedtime    MEDICATIONS  (PRN):  acetaminophen     Tablet .. 650 milliGRAM(s) Oral every 6 hours PRN Mild Pain (1 - 3)      ALLERGIES:  Allergies    No Known Allergies    Intolerances        LABS:                        15.0   3.25  )-----------( 223      ( 28 Nov 2022 05:30 )             42.3     11-28    140  |  104  |  13  ----------------------------<  90  4.4   |  29  |  0.91    Ca    9.8      28 Nov 2022 05:30  Phos  2.5     11-28  Mg     2.0     11-28          CAPILLARY BLOOD GLUCOSE          RADIOLOGY & ADDITIONAL TESTS: Reviewed. OVERNIGHT EVENTS:  JOEL ON     SUBJECTIVE / INTERVAL HPI: Patient seen and examined at bedside.     CONSTITUTIONAL: No weakness, fevers or chills  EYES/ENT: No visual changes;  No vertigo or throat pain   NECK: No pain or stiffness  RESPIRATORY: No cough, wheezing, hemoptysis; No shortness of breath  CARDIOVASCULAR: No chest pain or palpitations  GASTROINTESTINAL: No abdominal or epigastric pain. No nausea, vomiting, or hematemesis; Had 2 BM today, no longer feels constipated  GENITOURINARY: No dysuria, frequency or hematuria  NEUROLOGICAL: No numbness or weakness  MSK: c/o R arm pain  SKIN: No itching, burning, rashes, or lesions   All other review of systems is negative unless indicated above.      VITAL SIGNS:  Vital Signs Last 24 Hrs  T(C): 36.7 (28 Nov 2022 11:56), Max: 36.9 (28 Nov 2022 05:06)  T(F): 98 (28 Nov 2022 11:56), Max: 98.4 (28 Nov 2022 05:06)  HR: 64 (28 Nov 2022 11:56) (63 - 67)  BP: 147/80 (28 Nov 2022 11:56) (128/77 - 147/80)  BP(mean): --  RR: 18 (28 Nov 2022 11:56) (16 - 18)  SpO2: 99% (28 Nov 2022 11:56) (97% - 99%)    Parameters below as of 28 Nov 2022 11:56  Patient On (Oxygen Delivery Method): room air        PHYSICAL EXAM:    General: NAD, talkative and cooperative  HEENT: NCAT; PERRL, anicteric sclera; MMM  Neck: supple, trachea midline  Cardiovascular: S1, S2 normal; RRR, no M/G/R  Respiratory: CTABL; no W/R/R  Gastrointestinal: soft, nontender, nondistended. bowel sounds present.  Skin: no ulcerations or visible rashes appreciated  Extremities: WWP; no edema, clubbing or cyanosis  Vascular: 2+ radial, DP/PT pulses B/L  Neurological: AAOx3; CN II-XII grossly intact; no focal deficits, R deltoid pain, pain with elevating arm to  90 degrees    MEDICATIONS:  MEDICATIONS  (STANDING):  aspirin enteric coated 81 milliGRAM(s) Oral daily  atorvastatin 80 milliGRAM(s) Oral at bedtime  bictegravir 50 mG/emtricitabine 200 mG/tenofovir alafenamide 25 mG (BIKTARVY) 1 Tablet(s) Oral daily  clopidogrel Tablet 75 milliGRAM(s) Oral daily  darunavir 800 mG/cobicstat 150 mG 1 Tablet(s) Oral daily  enoxaparin Injectable 40 milliGRAM(s) SubCutaneous every 24 hours  lidocaine   4% Patch 1 Patch Transdermal daily  lisinopril 5 milliGRAM(s) Oral daily  polyethylene glycol 3350 17 Gram(s) Oral daily  senna 2 Tablet(s) Oral at bedtime    MEDICATIONS  (PRN):  acetaminophen     Tablet .. 650 milliGRAM(s) Oral every 6 hours PRN Mild Pain (1 - 3)      ALLERGIES:  Allergies    No Known Allergies    Intolerances        LABS:                        15.0   3.25  )-----------( 223      ( 28 Nov 2022 05:30 )             42.3     11-28    140  |  104  |  13  ----------------------------<  90  4.4   |  29  |  0.91    Ca    9.8      28 Nov 2022 05:30  Phos  2.5     11-28  Mg     2.0     11-28          CAPILLARY BLOOD GLUCOSE          RADIOLOGY & ADDITIONAL TESTS: Reviewed.

## 2022-11-28 NOTE — PROGRESS NOTE ADULT - PROBLEM SELECTOR PLAN 2
pt on home prezcobix 800-150 qd AND bictavy 30 qd  both picked up same day 11/17. [facility] pharmacy doesn't have biktarvy 30 (peds dosage). Holding biktarvy for now until confirmation of pt's dosage. [facility] is closed [facility] 11/26 and pt unaware of dosage.  - HIV consulted, recommends getting T cell subset, and starting home Prezcobix 800mg-150mg daily.  - viral load >130k  -f/u further HIV recs  -f/u morning T cell subset  -collaterals for hoem biktarvy dosage

## 2022-11-28 NOTE — PROGRESS NOTE ADULT - PROBLEM SELECTOR PLAN 1
- s/p ASA 325mg and Plavix 300mg PO load at [facility]  - continue aspirin 81mg and plavix 75mg daily  - continue atorvastatin 80mg daily  - q4hr stroke neuro checks and vitals  - MRI Brain without contrast:  L BG infarct  - Stroke Code HCT Results: negative  - Stroke Code CTA Results: negative for steno-occlusive disease  - Stroke education - s/p ASA 325mg and [facility]mg [facility]  - continue aspirin 81mg and plavix 75mg daily  - continue atorvastatin 80mg daily  - q4hr stroke neuro checks and vitals  - MRI Brain without contrast:  L BG infarct  - Stroke Code HCT Results: negative  - Stroke Code CTA Results: negative for steno-occlusive disease  - Stroke education  -plan for loop recorder tmro  -consutled orthopedic team for R rotator cuff tear to determine if R sided pain/weakness is from rotator cuff tear vs residual stroke sx  -coag panel, covid swab and T&S

## 2022-11-28 NOTE — PROGRESS NOTE ADULT - ASSESSMENT
62 years old male with hx of HIV on antiviral, active Cocaine use, HTN on monotherapy, right-handed person ( PMD at [place] [address] )- presented to [facility] for right sided weakness- NIHSS 3 - MRI with acute infarct on Left basal ganglion, TTE/DAVID done on 11/25- non revealing   currently sinus on tele.

## 2022-11-29 ENCOUNTER — TRANSCRIPTION ENCOUNTER (OUTPATIENT)
Age: 62
End: 2022-11-29

## 2022-11-29 LAB
ANION GAP SERPL CALC-SCNC: 6 MMOL/L — SIGNIFICANT CHANGE UP (ref 5–17)
ANISOCYTOSIS BLD QL: SLIGHT — SIGNIFICANT CHANGE UP
APTT BLD: 29.3 SEC — SIGNIFICANT CHANGE UP (ref 27.5–35.5)
BASOPHILS # BLD AUTO: 0.03 K/UL — SIGNIFICANT CHANGE UP (ref 0–0.2)
BASOPHILS NFR BLD AUTO: 0.9 % — SIGNIFICANT CHANGE UP (ref 0–2)
BLD GP AB SCN SERPL QL: NEGATIVE — SIGNIFICANT CHANGE UP
BUN SERPL-MCNC: 16 MG/DL — SIGNIFICANT CHANGE UP (ref 7–23)
CALCIUM SERPL-MCNC: 9.6 MG/DL — SIGNIFICANT CHANGE UP (ref 8.4–10.5)
CHLORIDE SERPL-SCNC: 100 MMOL/L — SIGNIFICANT CHANGE UP (ref 96–108)
CO2 SERPL-SCNC: 29 MMOL/L — SIGNIFICANT CHANGE UP (ref 22–31)
CREAT SERPL-MCNC: 1.06 MG/DL — SIGNIFICANT CHANGE UP (ref 0.5–1.3)
EGFR: 78 ML/MIN/1.73M2 — SIGNIFICANT CHANGE UP
EOSINOPHIL # BLD AUTO: 0.32 K/UL — SIGNIFICANT CHANGE UP (ref 0–0.5)
EOSINOPHIL NFR BLD AUTO: 9.9 % — HIGH (ref 0–6)
GIANT PLATELETS BLD QL SMEAR: PRESENT — SIGNIFICANT CHANGE UP
GLUCOSE SERPL-MCNC: 105 MG/DL — HIGH (ref 70–99)
HCT VFR BLD CALC: 43.2 % — SIGNIFICANT CHANGE UP (ref 39–50)
HGB BLD-MCNC: 15.4 G/DL — SIGNIFICANT CHANGE UP (ref 13–17)
HYPOCHROMIA BLD QL: SLIGHT — SIGNIFICANT CHANGE UP
INR BLD: 1.11 — SIGNIFICANT CHANGE UP (ref 0.88–1.16)
LYMPHOCYTES # BLD AUTO: 0.32 K/UL — LOW (ref 1–3.3)
LYMPHOCYTES # BLD AUTO: 9.9 % — LOW (ref 13–44)
MAGNESIUM SERPL-MCNC: 2 MG/DL — SIGNIFICANT CHANGE UP (ref 1.6–2.6)
MANUAL SMEAR VERIFICATION: SIGNIFICANT CHANGE UP
MCHC RBC-ENTMCNC: 26.6 PG — LOW (ref 27–34)
MCHC RBC-ENTMCNC: 35.6 GM/DL — SIGNIFICANT CHANGE UP (ref 32–36)
MCV RBC AUTO: 74.6 FL — LOW (ref 80–100)
MONOCYTES # BLD AUTO: 0.53 K/UL — SIGNIFICANT CHANGE UP (ref 0–0.9)
MONOCYTES NFR BLD AUTO: 16.2 % — HIGH (ref 2–14)
NEUTROPHILS # BLD AUTO: 1.23 K/UL — LOW (ref 1.8–7.4)
NEUTROPHILS NFR BLD AUTO: 37.9 % — LOW (ref 43–77)
OVALOCYTES BLD QL SMEAR: SLIGHT — SIGNIFICANT CHANGE UP
PHOSPHATE SERPL-MCNC: 2.7 MG/DL — SIGNIFICANT CHANGE UP (ref 2.5–4.5)
PLAT MORPH BLD: ABNORMAL
PLATELET # BLD AUTO: 218 K/UL — SIGNIFICANT CHANGE UP (ref 150–400)
POLYCHROMASIA BLD QL SMEAR: SLIGHT — SIGNIFICANT CHANGE UP
POTASSIUM SERPL-MCNC: 4.5 MMOL/L — SIGNIFICANT CHANGE UP (ref 3.5–5.3)
POTASSIUM SERPL-SCNC: 4.5 MMOL/L — SIGNIFICANT CHANGE UP (ref 3.5–5.3)
PROTHROM AB SERPL-ACNC: 13.2 SEC — SIGNIFICANT CHANGE UP (ref 10.5–13.4)
RBC # BLD: 5.79 M/UL — SIGNIFICANT CHANGE UP (ref 4.2–5.8)
RBC # FLD: 14.4 % — SIGNIFICANT CHANGE UP (ref 10.3–14.5)
RBC BLD AUTO: ABNORMAL
RH IG SCN BLD-IMP: POSITIVE — SIGNIFICANT CHANGE UP
SMUDGE CELLS # BLD: PRESENT — SIGNIFICANT CHANGE UP
SODIUM SERPL-SCNC: 135 MMOL/L — SIGNIFICANT CHANGE UP (ref 135–145)
TARGETS BLD QL SMEAR: SLIGHT — SIGNIFICANT CHANGE UP
VARIANT LYMPHS # BLD: 25.2 % — HIGH (ref 0–6)
WBC # BLD: 3.25 K/UL — LOW (ref 3.8–10.5)
WBC # FLD AUTO: 3.25 K/UL — LOW (ref 3.8–10.5)

## 2022-11-29 PROCEDURE — 33285 INSJ SUBQ CAR RHYTHM MNTR: CPT

## 2022-11-29 PROCEDURE — 99233 SBSQ HOSP IP/OBS HIGH 50: CPT

## 2022-11-29 PROCEDURE — 99232 SBSQ HOSP IP/OBS MODERATE 35: CPT | Mod: GC

## 2022-11-29 RX ORDER — LISINOPRIL 2.5 MG/1
1 TABLET ORAL
Qty: 0 | Refills: 0 | DISCHARGE
Start: 2022-11-29

## 2022-11-29 RX ORDER — LISINOPRIL 2.5 MG/1
1 TABLET ORAL
Qty: 0 | Refills: 0 | DISCHARGE

## 2022-11-29 RX ORDER — ASPIRIN/CALCIUM CARB/MAGNESIUM 324 MG
1 TABLET ORAL
Qty: 30 | Refills: 0
Start: 2022-11-29 | End: 2022-12-28

## 2022-11-29 RX ORDER — AMLODIPINE BESYLATE 2.5 MG/1
1 TABLET ORAL
Qty: 0 | Refills: 0 | DISCHARGE

## 2022-11-29 RX ORDER — ATORVASTATIN CALCIUM 80 MG/1
1 TABLET, FILM COATED ORAL
Qty: 30 | Refills: 0
Start: 2022-11-29 | End: 2022-12-28

## 2022-11-29 RX ORDER — CLOPIDOGREL BISULFATE 75 MG/1
1 TABLET, FILM COATED ORAL
Qty: 30 | Refills: 0
Start: 2022-11-29 | End: 2022-12-28

## 2022-11-29 RX ADMIN — ENOXAPARIN SODIUM 40 MILLIGRAM(S): 100 INJECTION SUBCUTANEOUS at 18:00

## 2022-11-29 RX ADMIN — LIDOCAINE 1 PATCH: 4 CREAM TOPICAL at 10:55

## 2022-11-29 RX ADMIN — Medication 81 MILLIGRAM(S): at 10:55

## 2022-11-29 RX ADMIN — BICTEGRAVIR SODIUM, EMTRICITABINE, AND TENOFOVIR ALAFENAMIDE FUMARATE 1 TABLET(S): 30; 120; 15 TABLET ORAL at 10:55

## 2022-11-29 RX ADMIN — CLOPIDOGREL BISULFATE 75 MILLIGRAM(S): 75 TABLET, FILM COATED ORAL at 10:54

## 2022-11-29 RX ADMIN — ATORVASTATIN CALCIUM 20 MILLIGRAM(S): 80 TABLET, FILM COATED ORAL at 21:33

## 2022-11-29 RX ADMIN — POLYETHYLENE GLYCOL 3350 17 GRAM(S): 17 POWDER, FOR SOLUTION ORAL at 10:54

## 2022-11-29 RX ADMIN — Medication 650 MILLIGRAM(S): at 19:00

## 2022-11-29 RX ADMIN — Medication 650 MILLIGRAM(S): at 11:50

## 2022-11-29 RX ADMIN — LIDOCAINE 1 PATCH: 4 CREAM TOPICAL at 18:02

## 2022-11-29 RX ADMIN — Medication 650 MILLIGRAM(S): at 18:00

## 2022-11-29 RX ADMIN — LISINOPRIL 5 MILLIGRAM(S): 2.5 TABLET ORAL at 06:18

## 2022-11-29 RX ADMIN — LIDOCAINE 1 PATCH: 4 CREAM TOPICAL at 22:00

## 2022-11-29 RX ADMIN — DARUNAVIR ETHANOLATE AND COBICISTAT 1 TABLET(S): 800; 150 TABLET, FILM COATED ORAL at 10:58

## 2022-11-29 RX ADMIN — Medication 650 MILLIGRAM(S): at 10:54

## 2022-11-29 RX ADMIN — Medication 1 TABLET(S): at 10:53

## 2022-11-29 NOTE — DISCHARGE NOTE PROVIDER - CARE PROVIDER_API CALL
Myriam Segal)  Neurology  [address] [address]  Phone: [phone]  Fax: [phone]  Follow Up Time: 2 weeks

## 2022-11-29 NOTE — PROGRESS NOTE ADULT - ASSESSMENT
62 years old male with hx of HIV on antiviral, active Cocaine use, HTN on monotherapy, right-handed person ( PMD at [place] [address] )- presented to [facility] for right sided weakness- NIHSS 3 - MRI with acute infarct on Left basal ganglion, TTE/DAVID done on 11/25- non revealing   currently sinus on tele.

## 2022-11-29 NOTE — DISCHARGE NOTE PROVIDER - HOSPITAL COURSE
#Discharge: do not delete    62y Male with PMHx of HIV, active cocaine use, presenting with ischemic stroke. Pending acute rehab    Hospital course (by problem):     62 years old male with hx of HIV on antiviral, active Cocaine use, HTN on Biktarvy, presented to [facility] for right sided weakness- NIHSS 3 - MRI with acute infarct on Left basal ganglion, CT perfusion brain performed,shows no definite acute intracranial abnormality, CT angio head and neck performed. TTE/DAVID done on 11/25- non revealing. Started lovenox 40mg SQ, ASA and Plavix. Started home biktarvy and Prezcobix. On 11/28, started PCP ppx due to CD4 count <200 with DS Bactrim daily. During admission, pt developed constipation with no BM >3 days, given lactulose 10g, pt subsequently had 2 BMs. Pt reports he is scheduled for R rotator cuff surgery in OSH on 11/28. Consulted ortho team, wants outpt collaterals. Called [facility] for collaterals.    #CVA (cerebrovascular accident).   -p/w R UE and LE weakness. s/p ASA 325mg and Plavix 300mg PO load at [facility]. MRI Brain without contrast performed:  L basal ganglia infarct. Stroke Code HCT Results: negative. Stroke Code CTA Results: negative for steno-occlusive disease    -loop recorder placed today, monitor for pain and local irritation  -c/w ASA 1mg and plavix 75mg daily  - tx with atorvastatin 80mg daily  -consulted orthopedic team for R rotator cuff tear to determine if R sided pain/weakness is from rotator cuff tear vs residual stroke sx  -collaterals from [facility]: pt was scheduled for MRI on Nov 17th and follows with Dr. Garcia, but MRI never performed.       #HIV infection.   -pt on home prezcobix 800-150 qd AND bictavy 30 qd  both picked up same day 11/17. [facility] pharmacy doesn't have biktarvy 30 (peds dosage). Holding biktarvy for now until confirmation of pt's dosage. [facility]/26 and pt unaware of dosage. viral load >130k.  -tx w home biktary and prezcobix  -CD4 192, started on PCP ppx with DS bactrim daily       # Constipation.   -pt c/o feeling constipated. Reports last BM 4 days ago. Currently on miralax and senna.  -given laculose 10g 1x, resolved     # Hyperlipidemia.   -LDL results: 109  -tx w high dose Lipitor 80mg daily.    #Hypertension.   -Home amlodipine 10mg, lisinopril 20mg  - hold home bp meds for permissive hypertension, Goal -180  - TTE with bubble performed- negative        Patient was discharged to: home    New medications:     Changes to old medications:    Medications that were stopped:    Items to follow up as outpatient:    Physical exam at the time of discharge:       #Discharge: do not delete    62y Male with PMHx of HIV, active cocaine use, presenting with ischemic stroke. Pending acute rehab    Hospital course (by problem):     62 years old male with hx of HIV on antiviral, active Cocaine use, HTN on Biktarvy, presented to [facility] for right sided weakness- NIHSS 3 - MRI with acute infarct on Left basal ganglion, CT perfusion brain performed,shows no definite acute intracranial abnormality, CT angio head and neck performed. TTE/DAVID done on 11/25- non revealing. Started lovenox 40mg SQ, ASA and Plavix. Started home biktarvy and Prezcobix. On 11/28, started PCP ppx due to CD4 count <200 with DS Bactrim daily. During admission, pt developed constipation with no BM >3 days, given lactulose 10g, pt subsequently had 2 BMs. Pt reports he is scheduled for R rotator cuff surgery in OSH on 11/28. Consulted ortho team, wants outpt collaterals. Called [facility] for collaterals.    #CVA (cerebrovascular accident).   -p/w R UE and LE weakness. s/p ASA 325mg and Plavix 300mg PO load at [facility]. MRI Brain without contrast performed:  L basal ganglia infarct. Stroke Code HCT Results: negative. Stroke Code CTA Results: negative for steno-occlusive disease  -loop recorder placed today, monitor for pain and local irritation  -c/w ASA 1mg and plavix 75mg daily  - tx with atorvastatin 80mg daily  -consulted orthopedic team for R rotator cuff tear to determine if R sided pain/weakness is from rotator cuff tear vs residual stroke sx  -collaterals from [facility]: pt was scheduled for MRI on Nov 17th and follows with Dr. Garcia, but MRI never performed.       #HIV infection.   -pt on home prezcobix 800-150 qd AND bictavy 30 qd  both picked up same day 11/17. [facility] pharmacy doesn't have biktarvy 30 (peds dosage). Holding biktarvy for now until confirmation of pt's dosage. [facility]/26 and pt unaware of dosage. viral load >130k.  -tx w home biktary and prezcobix  -CD4 192, started on PCP ppx with DS bactrim daily       # Constipation.   -pt c/o feeling constipated. Reports last BM 4 days ago. Currently on miralax and senna.  -given laculose 10g 1x, resolved     # Hyperlipidemia.   -LDL results: 109  -tx w high dose Lipitor 80mg daily.    #Hypertension.   -Home amlodipine 10mg, lisinopril 20mg  - hold home bp meds for permissive hypertension, Goal -180  - TTE with bubble performed- negative        Patient was discharged to: home    New medications: DS bactrim for PCP ppx    Changes to old medications: none    Medications that were stopped:none    Items to follow up as outpatient:    Physical exam at the time of discharge:       #Discharge: do not delete    62y Male with PMHx of HIV, active cocaine use, presenting with ischemic stroke. Pending acute rehab    Hospital course (by problem):     62 years old male with hx of HIV on antiviral, active Cocaine use, HTN on Biktarvy, presented to [facility] for right sided weakness- NIHSS 3 - MRI with acute infarct on Left basal ganglion, CT perfusion brain performed,shows no definite acute intracranial abnormality, CT angio head and neck performed. TTE/DAVID done on 11/25- non revealing. Started lovenox 40mg SQ, ASA and Plavix. Started home biktarvy and Prezcobix. On 11/28, started PCP ppx due to CD4 count <200 with DS Bactrim daily. During admission, pt developed constipation with no BM >3 days, given lactulose 10g, pt subsequently had 2 BMs. Pt reports he is scheduled for R rotator cuff surgery in OSH on 11/28. Consulted ortho team, wants outpt collaterals. Called [facility] for collaterals.    #CVA (cerebrovascular accident).   -p/w R UE and LE weakness. s/p ASA 325mg and Plavix 300mg PO load at [facility]. MRI Brain without contrast performed:  L basal ganglia infarct. Stroke Code HCT Results: negative. Stroke Code CTA Results: negative for steno-occlusive disease  -loop recorder placed today, monitor for pain and local irritation  -c/w ASA 1mg and plavix 75mg daily  - tx with atorvastatin 80mg daily  -consulted orthopedic team for R rotator cuff tear to determine if R sided pain/weakness is from rotator cuff tear vs residual stroke sx  -collaterals from [facility]: pt was scheduled for MRI on Nov 17th and follows with Dr. Garcia, but MRI never performed.       #HIV infection.   -pt on home prezcobix 800-150 qd AND bictavy 30 qd  both picked up same day 11/17. [facility] pharmacy doesn't have biktarvy 30 (peds dosage). Holding biktarvy for now until confirmation of pt's dosage. [facility] is [facility] 11/26 and pt unaware of dosage. viral load >130k.  -tx w home biktary and prezcobix  -CD4 192, started on PCP ppx with DS bactrim daily       # Constipation.   -pt c/o feeling constipated. Reports last BM 4 days ago. Currently on miralax and senna.  -given laculose 10g 1x, resolved     # Hyperlipidemia.   -LDL results: 109  -tx w high dose Lipitor 80mg daily.    #Hypertension.   -Home amlodipine 10mg, lisinopril 20mg  - hold home bp meds for permissive hypertension, Goal -180  - TTE with bubble performed- negative        Patient was discharged to: home    New medications: DS bactrim for PCP ppx    Changes to old medications: none    Medications that were stopped:none    Items to follow up as outpatient:    Physical exam at the time of discharge:  Neurologic:  -Mental status: Awake, alert, oriented to person, place, and time. Speech is fluent with intact naming, repetition, and comprehension, Slurred speech and intermittent stuttering. Recent and remote memory intact. Follows commands. Attention/concentration intact. Fund of knowledge appropriate.  -Cranial nerves:   II: Visual fields are full to confrontation.  III, IV, VI: Extraocular movements are intact without nystagmus. Pupils equally round and reactive to light  V: Intact V1-V3  VII: R NLFF  Motor: Normal bulk and tone. No pronator drift. LUE/LLE : 5/5, RUE : Antigravity, able to lift off the bed and hold it for count of 10, does not hit the bed, but unable to lift as high as left arm, minimal wavering. Right lower extremity 5-/5  Sensation: Intact to light touch bilaterally. No neglect or extinction on double simultaneous testing.  Coordination: No dysmetria on finger-to-nose out of proportion to weakness.    NIHSS: 3

## 2022-11-29 NOTE — DISCHARGE NOTE PROVIDER - NSDCCPCAREPLAN_GEN_ALL_CORE_FT
PRINCIPAL DISCHARGE DIAGNOSIS  Diagnosis: Weakness  Assessment and Plan of Treatment: You presented to the hospital with right sided weakness. We performed an MRI imaging exam of your head which showed a stroke in your left basal ganglion, which is a region of your brain involved in movement.  We started you on medication to protect against blood clots, including lovenox, aspirin and plavix, these medications prevent clots from forming and protects the brains from          SECONDARY DISCHARGE DIAGNOSES  Diagnosis: HIV infection  Assessment and Plan of Treatment: We started you on your home biktarvy and Prezcobix medications for HIV. We checked your T cell level, which was low and decided to start a medication called Bactrim for protection against pneumocystis jirovecii (PCP), which is a virus that can cause severe lung infections in patients with very low T cells. In order to prevent PCP infections in patients with low T cells, we recommend starting therapy with Bactrim. Upon discharge, please continue to take daily double strength bactrim once a day. Please follow up with your primary care provider for further management of your care.     PRINCIPAL DISCHARGE DIAGNOSIS  Diagnosis: Weakness  Assessment and Plan of Treatment: You presented to the hospital with right sided weakness. We performed an MRI imaging exam of your head which showed a stroke in your left basal ganglion, which is a region of your brain involved in movement.  We started you on medication to protect against blood clots, including lovenox, aspirin and plavix, these medications prevent clots from forming and protects the brains from  Your symptoms gradualy improved. Please follow up with your neurologist Dr. Segal, her office will call you to schedule an appointment for furthr management and brain imaging.        SECONDARY DISCHARGE DIAGNOSES  Diagnosis: HIV infection  Assessment and Plan of Treatment: We started you on your home biktarvy and Prezcobix medications for HIV. We checked your T cell level, which was low and decided to start a medication called Bactrim for protection against pneumocystis jirovecii (PCP), which is a virus that can cause severe lung infections in patients with very low T cells. In order to prevent PCP infections in patients with low T cells, we recommend starting therapy with Bactrim. Upon discharge, please continue to take daily double strength bactrim once a day. Please follow up with your primary care provider for further management of your care.

## 2022-11-29 NOTE — PROGRESS NOTE ADULT - SUBJECTIVE AND OBJECTIVE BOX
OVERNIGHT EVENTS:    SUBJECTIVE / INTERVAL HPI: Patient seen and examined at bedside.     VITAL SIGNS:  Vital Signs Last 24 Hrs  T(C): 36.7 (29 Nov 2022 05:30), Max: 36.8 (28 Nov 2022 20:30)  T(F): 98.1 (29 Nov 2022 05:30), Max: 98.2 (28 Nov 2022 20:30)  HR: 60 (29 Nov 2022 05:30) (60 - 64)  BP: 149/88 (29 Nov 2022 05:30) (138/82 - 149/88)  BP(mean): --  RR: 18 (29 Nov 2022 05:30) (18 - 18)  SpO2: 98% (29 Nov 2022 05:30) (98% - 99%)    Parameters below as of 29 Nov 2022 05:30  Patient On (Oxygen Delivery Method): room air        PHYSICAL EXAM:    General: WDWN  HEENT: NCAT; PERRL, anicteric sclera; MMM  Neck: supple, trachea midline  Cardiovascular: S1, S2 normal; RRR, no M/G/R  Respiratory: CTABL; no W/R/R  Gastrointestinal: soft, nontender, nondistended. bowel sounds present.  Skin: no ulcerations or visible rashes appreciated  Extremities: WWP; no edema, clubbing or cyanosis  Vascular: 2+ radial, DP/PT pulses B/L  Neurological: AAOx3; CN II-XII grossly intact; no focal deficits    MEDICATIONS:  MEDICATIONS  (STANDING):  aspirin enteric coated 81 milliGRAM(s) Oral daily  atorvastatin 20 milliGRAM(s) Oral at bedtime  bictegravir 50 mG/emtricitabine 200 mG/tenofovir alafenamide 25 mG (BIKTARVY) 1 Tablet(s) Oral daily  clopidogrel Tablet 75 milliGRAM(s) Oral daily  darunavir 800 mG/cobicstat 150 mG 1 Tablet(s) Oral daily  enoxaparin Injectable 40 milliGRAM(s) SubCutaneous every 24 hours  lidocaine   4% Patch 1 Patch Transdermal daily  lisinopril 5 milliGRAM(s) Oral daily  polyethylene glycol 3350 17 Gram(s) Oral daily  senna 2 Tablet(s) Oral at bedtime  trimethoprim  160 mG/sulfamethoxazole 800 mG 1 Tablet(s) Oral daily    MEDICATIONS  (PRN):  acetaminophen     Tablet .. 650 milliGRAM(s) Oral every 6 hours PRN Mild Pain (1 - 3)      ALLERGIES:  Allergies    No Known Allergies    Intolerances        LABS:                        15.4   3.25  )-----------( 218      ( 29 Nov 2022 05:30 )             43.2     11-28    140  |  104  |  13  ----------------------------<  90  4.4   |  29  |  0.91    Ca    9.8      28 Nov 2022 05:30  Phos  2.5     11-28  Mg     2.0     11-28          CAPILLARY BLOOD GLUCOSE          RADIOLOGY & ADDITIONAL TESTS: Reviewed. Hospital Course:   62 years old male with hx of HIV on antiviral, active Cocaine use, HTN on Biktarvy, presented to [facility] for right sided weakness- NIHSS 3 - MRI with acute infarct on Left basal ganglion, TTE/DAVID done on 11/25- non revealing. CTH      OVERNIGHT EVENTS:    SUBJECTIVE / INTERVAL HPI: Patient seen and examined at bedside.     VITAL SIGNS:  Vital Signs Last 24 Hrs  T(C): 36.7 (29 Nov 2022 05:30), Max: 36.8 (28 Nov 2022 20:30)  T(F): 98.1 (29 Nov 2022 05:30), Max: 98.2 (28 Nov 2022 20:30)  HR: 60 (29 Nov 2022 05:30) (60 - 64)  BP: 149/88 (29 Nov 2022 05:30) (138/82 - 149/88)  BP(mean): --  RR: 18 (29 Nov 2022 05:30) (18 - 18)  SpO2: 98% (29 Nov 2022 05:30) (98% - 99%)    Parameters below as of 29 Nov 2022 05:30  Patient On (Oxygen Delivery Method): room air        PHYSICAL EXAM:    General: WDWN  HEENT: NCAT; PERRL, anicteric sclera; MMM  Neck: supple, trachea midline  Cardiovascular: S1, S2 normal; RRR, no M/G/R  Respiratory: CTABL; no W/R/R  Gastrointestinal: soft, nontender, nondistended. bowel sounds present.  Skin: no ulcerations or visible rashes appreciated  Extremities: WWP; no edema, clubbing or cyanosis  Vascular: 2+ radial, DP/PT pulses B/L  Neurological: AAOx3; CN II-XII grossly intact; no focal deficits    MEDICATIONS:  MEDICATIONS  (STANDING):  aspirin enteric coated 81 milliGRAM(s) Oral daily  atorvastatin 20 milliGRAM(s) Oral at bedtime  bictegravir 50 mG/emtricitabine 200 mG/tenofovir alafenamide 25 mG (BIKTARVY) 1 Tablet(s) Oral daily  clopidogrel Tablet 75 milliGRAM(s) Oral daily  darunavir 800 mG/cobicstat 150 mG 1 Tablet(s) Oral daily  enoxaparin Injectable 40 milliGRAM(s) SubCutaneous every 24 hours  lidocaine   4% Patch 1 Patch Transdermal daily  lisinopril 5 milliGRAM(s) Oral daily  polyethylene glycol 3350 17 Gram(s) Oral daily  senna 2 Tablet(s) Oral at bedtime  trimethoprim  160 mG/sulfamethoxazole 800 mG 1 Tablet(s) Oral daily    MEDICATIONS  (PRN):  acetaminophen     Tablet .. 650 milliGRAM(s) Oral every 6 hours PRN Mild Pain (1 - 3)      ALLERGIES:  Allergies    No Known Allergies    Intolerances        LABS:                        15.4   3.25  )-----------( 218      ( 29 Nov 2022 05:30 )             43.2     11-28    140  |  104  |  13  ----------------------------<  90  4.4   |  29  |  0.91    Ca    9.8      28 Nov 2022 05:30  Phos  2.5     11-28  Mg     2.0     11-28          CAPILLARY BLOOD GLUCOSE          RADIOLOGY & ADDITIONAL TESTS: Reviewed. Hospital Course:   62 years old male with hx of HIV on antiviral, active Cocaine use, HTN on Biktarvy, presented to [facility] for right sided weakness- NIHSS 3 - MRI with acute infarct on Left basal ganglion, CT perfusion brain performed,shows no definite acute intracranial abnormality, CT angio head and neck performed. TTE/DAVID done on 11/25- non revealing. Started lovenox 40mg SQ, ASA and Plavix. Started home biktarvy and Prezcobix. On 11/28, started PCP ppx due to CD4 count <200 with DS Bactrim daily. Pt reports he is scheduled for R rotator cuff surgery in OSH on 11/28.      OVERNIGHT EVENTS:    SUBJECTIVE / INTERVAL HPI: Patient seen and examined at bedside.     VITAL SIGNS:  Vital Signs Last 24 Hrs  T(C): 36.7 (29 Nov 2022 05:30), Max: 36.8 (28 Nov 2022 20:30)  T(F): 98.1 (29 Nov 2022 05:30), Max: 98.2 (28 Nov 2022 20:30)  HR: 60 (29 Nov 2022 05:30) (60 - 64)  BP: 149/88 (29 Nov 2022 05:30) (138/82 - 149/88)  BP(mean): --  RR: 18 (29 Nov 2022 05:30) (18 - 18)  SpO2: 98% (29 Nov 2022 05:30) (98% - 99%)    Parameters below as of 29 Nov 2022 05:30  Patient On (Oxygen Delivery Method): room air        PHYSICAL EXAM:    General: WDWN  HEENT: NCAT; PERRL, anicteric sclera; MMM  Neck: supple, trachea midline  Cardiovascular: S1, S2 normal; RRR, no M/G/R  Respiratory: CTABL; no W/R/R  Gastrointestinal: soft, nontender, nondistended. bowel sounds present.  Skin: no ulcerations or visible rashes appreciated  Extremities: WWP; no edema, clubbing or cyanosis  Vascular: 2+ radial, DP/PT pulses B/L  Neurological: AAOx3; CN II-XII grossly intact; no focal deficits    MEDICATIONS:  MEDICATIONS  (STANDING):  aspirin enteric coated 81 milliGRAM(s) Oral daily  atorvastatin 20 milliGRAM(s) Oral at bedtime  bictegravir 50 mG/emtricitabine 200 mG/tenofovir alafenamide 25 mG (BIKTARVY) 1 Tablet(s) Oral daily  clopidogrel Tablet 75 milliGRAM(s) Oral daily  darunavir 800 mG/cobicstat 150 mG 1 Tablet(s) Oral daily  enoxaparin Injectable 40 milliGRAM(s) SubCutaneous every 24 hours  lidocaine   4% Patch 1 Patch Transdermal daily  lisinopril 5 milliGRAM(s) Oral daily  polyethylene glycol 3350 17 Gram(s) Oral daily  senna 2 Tablet(s) Oral at bedtime  trimethoprim  160 mG/sulfamethoxazole 800 mG 1 Tablet(s) Oral daily    MEDICATIONS  (PRN):  acetaminophen     Tablet .. 650 milliGRAM(s) Oral every 6 hours PRN Mild Pain (1 - 3)      ALLERGIES:  Allergies    No Known Allergies    Intolerances        LABS:                        15.4   3.25  )-----------( 218      ( 29 Nov 2022 05:30 )             43.2     11-28    140  |  104  |  13  ----------------------------<  90  4.4   |  29  |  0.91    Ca    9.8      28 Nov 2022 05:30  Phos  2.5     11-28  Mg     2.0     11-28          CAPILLARY BLOOD GLUCOSE          RADIOLOGY & ADDITIONAL TESTS: Reviewed. Hospital Course:   62 years old male with hx of HIV on antiviral, active Cocaine use, HTN on Biktarvy, presented to [facility] for right sided weakness- NIHSS 3 - MRI with acute infarct on Left basal ganglion, CT perfusion brain performed,shows no definite acute intracranial abnormality, CT angio head and neck performed. TTE/DAVID done on 11/25- non revealing. Started lovenox 40mg SQ, ASA and Plavix. Started home biktarvy and Prezcobix. On 11/28, started PCP ppx due to CD4 count <200 with DS Bactrim daily. During admission, pt developed constipation with no BM >3 days, given lactulose 10g, pt subsequently had 2 BMs. Pt reports he is scheduled for R rotator cuff surgery in OSH on 11/28.      OVERNIGHT EVENTS:    SUBJECTIVE / INTERVAL HPI: Patient seen and examined at bedside.     VITAL SIGNS:  Vital Signs Last 24 Hrs  T(C): 36.7 (29 Nov 2022 05:30), Max: 36.8 (28 Nov 2022 20:30)  T(F): 98.1 (29 Nov 2022 05:30), Max: 98.2 (28 Nov 2022 20:30)  HR: 60 (29 Nov 2022 05:30) (60 - 64)  BP: 149/88 (29 Nov 2022 05:30) (138/82 - 149/88)  BP(mean): --  RR: 18 (29 Nov 2022 05:30) (18 - 18)  SpO2: 98% (29 Nov 2022 05:30) (98% - 99%)    Parameters below as of 29 Nov 2022 05:30  Patient On (Oxygen Delivery Method): room air        PHYSICAL EXAM:    General: WDWN  HEENT: NCAT; PERRL, anicteric sclera; MMM  Neck: supple, trachea midline  Cardiovascular: S1, S2 normal; RRR, no M/G/R  Respiratory: CTABL; no W/R/R  Gastrointestinal: soft, nontender, nondistended. bowel sounds present.  Skin: no ulcerations or visible rashes appreciated  Extremities: WWP; no edema, clubbing or cyanosis  Vascular: 2+ radial, DP/PT pulses B/L  Neurological: AAOx3; CN II-XII grossly intact; no focal deficits    MEDICATIONS:  MEDICATIONS  (STANDING):  aspirin enteric coated 81 milliGRAM(s) Oral daily  atorvastatin 20 milliGRAM(s) Oral at bedtime  bictegravir 50 mG/emtricitabine 200 mG/tenofovir alafenamide 25 mG (BIKTARVY) 1 Tablet(s) Oral daily  clopidogrel Tablet 75 milliGRAM(s) Oral daily  darunavir 800 mG/cobicstat 150 mG 1 Tablet(s) Oral daily  enoxaparin Injectable 40 milliGRAM(s) SubCutaneous every 24 hours  lidocaine   4% Patch 1 Patch Transdermal daily  lisinopril 5 milliGRAM(s) Oral daily  polyethylene glycol 3350 17 Gram(s) Oral daily  senna 2 Tablet(s) Oral at bedtime  trimethoprim  160 mG/sulfamethoxazole 800 mG 1 Tablet(s) Oral daily    MEDICATIONS  (PRN):  acetaminophen     Tablet .. 650 milliGRAM(s) Oral every 6 hours PRN Mild Pain (1 - 3)      ALLERGIES:  Allergies    No Known Allergies    Intolerances        LABS:                        15.4   3.25  )-----------( 218      ( 29 Nov 2022 05:30 )             43.2     11-28    140  |  104  |  13  ----------------------------<  90  4.4   |  29  |  0.91    Ca    9.8      28 Nov 2022 05:30  Phos  2.5     11-28  Mg     2.0     11-28          CAPILLARY BLOOD GLUCOSE          RADIOLOGY & ADDITIONAL TESTS: Reviewed. Hospital Course:   62 years old male with hx of HIV on antiviral, active Cocaine use, HTN on Biktarvy, presented to [facility] for right sided weakness- NIHSS 3 - MRI with acute infarct on Left basal ganglion, CT perfusion brain performed,shows no definite acute intracranial abnormality, CT angio head and neck performed. TTE/DAVID done on 11/25- non revealing. Started lovenox 40mg SQ, ASA and Plavix. Started home biktarvy and Prezcobix. On 11/28, started PCP ppx due to CD4 count <200 with DS Bactrim daily. During admission, pt developed constipation with no BM >3 days, given lactulose 10g, pt subsequently had 2 BMs. Pt reports he is scheduled for R rotator cuff surgery in OSH on 11/28. Consulted ortho team, wants outpt collaterals. Called [facility] for collaterals.      OVERNIGHT EVENTS:  JOEL ON    SUBJECTIVE / INTERVAL HPI: Patient seen and examined at bedside.     CONSTITUTIONAL: No weakness, fevers or chills  EYES/ENT: No visual changes;  No vertigo or throat pain   NECK: No pain or stiffness  RESPIRATORY: No cough, wheezing, hemoptysis; No shortness of breath  CARDIOVASCULAR: No chest pain or palpitations  GASTROINTESTINAL: No abdominal or epigastric pain. No nausea, vomiting, or hematemesis. Last BM today.   GENITOURINARY: No dysuria, frequency or hematuria  NEUROLOGICAL: No numbness or weakness  MSK: c/o R shoulder pain radiating down to elbow  SKIN: No itching, burning, rashes, or lesions   All other review of systems is negative unless indicated above.    VITAL SIGNS:  Vital Signs Last 24 Hrs  T(C): 36.7 (29 Nov 2022 05:30), Max: 36.8 (28 Nov 2022 20:30)  T(F): 98.1 (29 Nov 2022 05:30), Max: 98.2 (28 Nov 2022 20:30)  HR: 60 (29 Nov 2022 05:30) (60 - 64)  BP: 149/88 (29 Nov 2022 05:30) (138/82 - 149/88)  BP(mean): --  RR: 18 (29 Nov 2022 05:30) (18 - 18)  SpO2: 98% (29 Nov 2022 05:30) (98% - 99%)    Parameters below as of 29 Nov 2022 05:30  Patient On (Oxygen Delivery Method): room air        PHYSICAL EXAM:    General: pt sitting up in bed, talkative and cooperative with PE, stuttering speech  HEENT: NCAT; PERRL, anicteric sclera; MMM  Neck: supple, trachea midline  Cardiovascular: S1, S2 normal; RRR, no M/G/R  Respiratory: CTABL; no W/R/R  Gastrointestinal: soft, nontender, nondistended. bowel sounds present.  Skin: no ulcerations or visible rashes appreciated  Extremities: WWP; no edema, clubbing or cyanosis  Vascular: 2+ radial, DP/PT pulses B/L  MSK:R shoulder weakness, unable to lift above 40degrees  Neurological: AAOx3; CN II-XII grossly intact; no focal deficits    MEDICATIONS:  MEDICATIONS  (STANDING):  aspirin enteric coated 81 milliGRAM(s) Oral daily  atorvastatin 20 milliGRAM(s) Oral at bedtime  bictegravir 50 mG/emtricitabine 200 mG/tenofovir alafenamide 25 mG (BIKTARVY) 1 Tablet(s) Oral daily  clopidogrel Tablet 75 milliGRAM(s) Oral daily  darunavir 800 mG/cobicstat 150 mG 1 Tablet(s) Oral daily  enoxaparin Injectable 40 milliGRAM(s) SubCutaneous every 24 hours  lidocaine   4% Patch 1 Patch Transdermal daily  lisinopril 5 milliGRAM(s) Oral daily  polyethylene glycol 3350 17 Gram(s) Oral daily  senna 2 Tablet(s) Oral at bedtime  trimethoprim  160 mG/sulfamethoxazole 800 mG 1 Tablet(s) Oral daily    MEDICATIONS  (PRN):  acetaminophen     Tablet .. 650 milliGRAM(s) Oral every 6 hours PRN Mild Pain (1 - 3)      ALLERGIES:  Allergies    No Known Allergies    Intolerances        LABS:                        15.4   3.25  )-----------( 218      ( 29 Nov 2022 05:30 )             43.2     11-28    140  |  104  |  13  ----------------------------<  90  4.4   |  29  |  0.91    Ca    9.8      28 Nov 2022 05:30  Phos  2.5     11-28  Mg     2.0     11-28          CAPILLARY BLOOD GLUCOSE          RADIOLOGY & ADDITIONAL TESTS: Reviewed.

## 2022-11-29 NOTE — PROGRESS NOTE ADULT - PROBLEM SELECTOR PLAN 2
pt on home prezcobix 800-150 qd AND bictavy 30 qd  both picked up same day 11/17. [facility] pharmacy doesn't have biktarvy 30 (peds dosage). Holding biktarvy for now until confirmation of pt's dosage. [facility]'s [facility] is closed [facility] 11/26 and pt unaware of dosage.  - HIV consulted, recommends getting T cell subset, and starting home Prezcobix 800mg-150mg daily.  - viral load >130k  -f/u further HIV recs  -f/u morning T cell subset  -collaterals for hoem biktarvy dosage pt on home prezcobix 800-150 qd AND bictavy 30 qd  both picked up same day 11/17. [facility] pharmacy doesn't have biktarvy 30 (peds dosage). Holding biktarvy for now until confirmation of pt's dosage. [facility]'s [facility] is closed today 11/26 and pt unaware of dosage. viral load >130k.    -c/w home biktary and prezcobix  -CD4 192, started on PCP ppx with DS bactrim daily  -f/u further HIV recs

## 2022-11-29 NOTE — PROGRESS NOTE ADULT - PROBLEM SELECTOR PLAN 1
c/w ASA 1mg and plavix 75mg daily  - continue atorvastatin 80mg daily  - q4hr stroke neuro checks and vitals  - Stroke education

## 2022-11-29 NOTE — PROGRESS NOTE ADULT - SUBJECTIVE AND OBJECTIVE BOX
Pt denies acute complaints  no cp/sob   ros otherwise       OVERNIGHT EVENTS:  JOEL ON    SUBJECTIVE / INTERVAL HPI: Patient seen and examined at bedside.     CONSTITUTIONAL: No weakness, fevers or chills  EYES/ENT: No visual changes;  No vertigo or throat pain   NECK: No pain or stiffness  RESPIRATORY: No cough, wheezing, hemoptysis; No shortness of breath  CARDIOVASCULAR: No chest pain or palpitations  GASTROINTESTINAL: No abdominal or epigastric pain. No nausea, vomiting, or hematemesis. Last BM today.   GENITOURINARY: No dysuria, frequency or hematuria  NEUROLOGICAL: No numbness or weakness  MSK: c/o R shoulder pain radiating down to elbow  SKIN: No itching, burning, rashes, or lesions   All other review of systems is negative unless indicated above.    VITAL SIGNS:  Vital Signs Last 24 Hrs  T(C): 36.7 (29 Nov 2022 05:30), Max: 36.8 (28 Nov 2022 20:30)  T(F): 98.1 (29 Nov 2022 05:30), Max: 98.2 (28 Nov 2022 20:30)  HR: 60 (29 Nov 2022 05:30) (60 - 64)  BP: 149/88 (29 Nov 2022 05:30) (138/82 - 149/88)  BP(mean): --  RR: 18 (29 Nov 2022 05:30) (18 - 18)  SpO2: 98% (29 Nov 2022 05:30) (98% - 99%)    Parameters below as of 29 Nov 2022 05:30  Patient On (Oxygen Delivery Method): room air        PHYSICAL EXAM:    General: pt sitting up in bed, talkative and cooperative with PE, stuttering speech  HEENT: NCAT; PERRL, anicteric sclera; MMM  Neck: supple, trachea midline  Cardiovascular: S1, S2 normal; RRR, no M/G/R  Respiratory: CTABL; no W/R/R  Gastrointestinal: soft, nontender, nondistended. bowel sounds present.  Skin: no ulcerations or visible rashes appreciated  Extremities: WWP; no edema, clubbing or cyanosis  Vascular: 2+ radial, DP/PT pulses B/L  MSK:R shoulder weakness, unable to lift above 40degrees  Neurological: AAOx3; CN II-XII grossly intact; no focal deficits    MEDICATIONS:  MEDICATIONS  (STANDING):  aspirin enteric coated 81 milliGRAM(s) Oral daily  atorvastatin 20 milliGRAM(s) Oral at bedtime  bictegravir 50 mG/emtricitabine 200 mG/tenofovir alafenamide 25 mG (BIKTARVY) 1 Tablet(s) Oral daily  clopidogrel Tablet 75 milliGRAM(s) Oral daily  darunavir 800 mG/cobicstat 150 mG 1 Tablet(s) Oral daily  enoxaparin Injectable 40 milliGRAM(s) SubCutaneous every 24 hours  lidocaine   4% Patch 1 Patch Transdermal daily  lisinopril 5 milliGRAM(s) Oral daily  polyethylene glycol 3350 17 Gram(s) Oral daily  senna 2 Tablet(s) Oral at bedtime  trimethoprim  160 mG/sulfamethoxazole 800 mG 1 Tablet(s) Oral daily    MEDICATIONS  (PRN):  acetaminophen     Tablet .. 650 milliGRAM(s) Oral every 6 hours PRN Mild Pain (1 - 3)      ALLERGIES:  Allergies    No Known Allergies    Intolerances        LABS:                        15.4   3.25  )-----------( 218      ( 29 Nov 2022 05:30 )             43.2     11-28    140  |  104  |  13  ----------------------------<  90  4.4   |  29  |  0.91    Ca    9.8      28 Nov 2022 05:30  Phos  2.5     11-28  Mg     2.0     11-28          CAPILLARY BLOOD GLUCOSE          RADIOLOGY & ADDITIONAL TESTS: Reviewed.

## 2022-11-29 NOTE — DISCHARGE NOTE PROVIDER - NSDCMRMEDTOKEN_GEN_ALL_CORE_FT
amLODIPine 10 mg oral tablet: 1 tab(s) orally once a day  bictegravir/emtricitabine/tenofovir 30 mg-120 mg-15 mg oral tablet: 1 tab(s) orally once a day  lisinopril 20 mg oral tablet: 1 tab(s) orally once a day  OLANZapine 10 mg oral tablet: 1 tab(s) orally once a day  Prezcobix 800 mg-150 mg oral tablet: 1 tab(s) orally once a day  SEROquel 100 mg oral tablet: 1 tab(s) orally once a day (at bedtime)  traZODone 100 mg oral tablet: 1 tab(s) orally once a day   aspirin 81 mg oral delayed release tablet: 1 tab(s) orally once a day  atorvastatin 20 mg oral tablet: 1 tab(s) orally once a day (at bedtime)  bictegravir/emtricitabine/tenofovir 30 mg-120 mg-15 mg oral tablet: 1 tab(s) orally once a day  clopidogrel 75 mg oral tablet: 1 tab(s) orally once a day  lisinopril 5 mg oral tablet: 1 tab(s) orally once a day  OLANZapine 10 mg oral tablet: 1 tab(s) orally once a day  Prezcobix 800 mg-150 mg oral tablet: 1 tab(s) orally once a day  SEROquel 100 mg oral tablet: 1 tab(s) orally once a day (at bedtime)  sulfamethoxazole-trimethoprim 800 mg-160 mg oral tablet: 1 tab(s) orally once a day  traZODone 100 mg oral tablet: 1 tab(s) orally once a day

## 2022-11-29 NOTE — PROGRESS NOTE ADULT - PROBLEM SELECTOR PLAN 3
pt c/o feeling constipated. Reports last BM 4 days ago. Currently on miralax and senna.    -given laculose 10g 1x, now having daily BM  -continue to monitor BM

## 2022-11-29 NOTE — PROGRESS NOTE ADULT - PROBLEM SELECTOR PLAN 6
F:as needed  E:replete mg<2, k<4  N:dash/TLC  A:lovenox 40mg SQ

## 2022-11-29 NOTE — PROGRESS NOTE ADULT - ASSESSMENT
62 years old male with hx of HIV on antiviral, active Cocaine use, HTN on monotherapy, right-handed person ( PMD at [place] [address] )- presented to [facility] for right sided weakness- NIHSS 3 - MRI with acute infarct on Left basal ganglion, TTE/DAVID done on 11/25- non revealing   currently sinus on tele.

## 2022-11-29 NOTE — PROGRESS NOTE ADULT - SUBJECTIVE AND OBJECTIVE BOX
EPS PA Procedure Note:    Diagnosis: CVA  s/p Loop Recorder Implant.  Used local anesthestic. Wound closed with Dermabond  Model: LINQ 2  Serial number:  [id number]   Follow up with Dr. Navin Bar on 1/24/23 at 10:10 AM.   [phone]   He can wet wound tomorrow. He was given instruction on how to use the Home Remote Monitoring system for his LINQ.   Allow the medical glue (Dermabond) to fall off on its own.    He can shower tomorrow.

## 2022-11-29 NOTE — PROGRESS NOTE ADULT - ATTENDING COMMENTS
The patient is a 62-year-old gentleman with a history of HIV, hypertension, and polysubstance use (heroin, cocaine) who is admitted after presenting with gait unsteadiness, right-sided weakness, and stuttering speech. MRI with + L BG acute stroke. TTE/DAVID unrevealing. Head CTA showed no significant findings. UDS + Cocaine. Pending ILR but cocaine use could have potentially been the cause of his stroke. For now, continue DAPT, statin ().

## 2022-11-29 NOTE — PROGRESS NOTE ADULT - PROBLEM SELECTOR PLAN 3
pt c/o feeling constipated. Reports last BM 4 days ago. Currently on miralax and senna.    -given laculose 10g 1x  -monitor BM pt c/o feeling constipated. Reports last BM 4 days ago. Currently on miralax and senna.    -given laculose 10g 1x, now having daily BM  -continue to monitor BM

## 2022-11-29 NOTE — PROGRESS NOTE ADULT - PROBLEM SELECTOR PLAN 1
- [facility]  - continue aspirin 81mg and plavix 75mg daily  - continue atorvastatin 80mg daily  - q4hr stroke neuro checks and vitals  - MRI Brain without contrast:  L BG infarct  - Stroke Code HCT Results: negative  - Stroke Code CTA Results: negative for steno-occlusive disease  - Stroke education  -plan for loop recorder tmro  -consutled orthopedic team for R rotator cuff tear to determine if R sided pain/weakness is from rotator cuff tear vs residual stroke sx  -coag panel, covid swab and T&S p/w R UE and LE weakness. s/p ASA 325mg and Plavix 300mg PO load at [facility]. MRI Brain without contrast performed:  L basal ganglia infarct. Stroke Code HCT Results: negative. Stroke Code CTA Results: negative for steno-occlusive disease    -loop recorder placed today, monitor for pain and local irritation  -c/w ASA 1mg and plavix 75mg daily  - continue atorvastatin 80mg daily  - q4hr stroke neuro checks and vitals  - Stroke education  -consulted orthopedic team for R rotator cuff tear to determine if R sided pain/weakness is from rotator cuff tear vs residual stroke sx  -collaterals from [facility]: pt was scheduled for MRI on Nov 17th and follows with Dr. Garcia, but MRI never performed.  -coag panel, covid swab and T&S

## 2022-11-30 ENCOUNTER — TRANSCRIPTION ENCOUNTER (OUTPATIENT)
Age: 62
End: 2022-11-30

## 2022-11-30 VITALS
RESPIRATION RATE: 18 BRPM | OXYGEN SATURATION: 98 % | TEMPERATURE: 98 F | DIASTOLIC BLOOD PRESSURE: 88 MMHG | SYSTOLIC BLOOD PRESSURE: 153 MMHG | HEART RATE: 68 BPM

## 2022-11-30 PROBLEM — I10 ESSENTIAL (PRIMARY) HYPERTENSION: Chronic | Status: ACTIVE | Noted: 2022-11-25

## 2022-11-30 PROBLEM — Z00.00 ENCOUNTER FOR PREVENTIVE HEALTH EXAMINATION: Status: ACTIVE | Noted: 2022-11-30

## 2022-11-30 PROCEDURE — 71045 X-RAY EXAM CHEST 1 VIEW: CPT

## 2022-11-30 PROCEDURE — 85730 THROMBOPLASTIN TIME PARTIAL: CPT

## 2022-11-30 PROCEDURE — 80053 COMPREHEN METABOLIC PANEL: CPT

## 2022-11-30 PROCEDURE — 85025 COMPLETE CBC W/AUTO DIFF WBC: CPT

## 2022-11-30 PROCEDURE — 84484 ASSAY OF TROPONIN QUANT: CPT

## 2022-11-30 PROCEDURE — 85610 PROTHROMBIN TIME: CPT

## 2022-11-30 PROCEDURE — 70551 MRI BRAIN STEM W/O DYE: CPT

## 2022-11-30 PROCEDURE — 86901 BLOOD TYPING SEROLOGIC RH(D): CPT

## 2022-11-30 PROCEDURE — C1764: CPT

## 2022-11-30 PROCEDURE — 87635 SARS-COV-2 COVID-19 AMP PRB: CPT

## 2022-11-30 PROCEDURE — 80048 BASIC METABOLIC PNL TOTAL CA: CPT

## 2022-11-30 PROCEDURE — 86803 HEPATITIS C AB TEST: CPT

## 2022-11-30 PROCEDURE — 84443 ASSAY THYROID STIM HORMONE: CPT

## 2022-11-30 PROCEDURE — 36415 COLL VENOUS BLD VENIPUNCTURE: CPT

## 2022-11-30 PROCEDURE — 70496 CT ANGIOGRAPHY HEAD: CPT

## 2022-11-30 PROCEDURE — 82962 GLUCOSE BLOOD TEST: CPT

## 2022-11-30 PROCEDURE — 86359 T CELLS TOTAL COUNT: CPT

## 2022-11-30 PROCEDURE — 99233 SBSQ HOSP IP/OBS HIGH 50: CPT | Mod: GC

## 2022-11-30 PROCEDURE — 86850 RBC ANTIBODY SCREEN: CPT

## 2022-11-30 PROCEDURE — 80061 LIPID PANEL: CPT

## 2022-11-30 PROCEDURE — 84100 ASSAY OF PHOSPHORUS: CPT

## 2022-11-30 PROCEDURE — 86360 T CELL ABSOLUTE COUNT/RATIO: CPT

## 2022-11-30 PROCEDURE — 97161 PT EVAL LOW COMPLEX 20 MIN: CPT

## 2022-11-30 PROCEDURE — 80307 DRUG TEST PRSMV CHEM ANLYZR: CPT

## 2022-11-30 PROCEDURE — 85027 COMPLETE CBC AUTOMATED: CPT

## 2022-11-30 PROCEDURE — 97162 PT EVAL MOD COMPLEX 30 MIN: CPT

## 2022-11-30 PROCEDURE — 70450 CT HEAD/BRAIN W/O DYE: CPT

## 2022-11-30 PROCEDURE — U0005: CPT

## 2022-11-30 PROCEDURE — 83036 HEMOGLOBIN GLYCOSYLATED A1C: CPT

## 2022-11-30 PROCEDURE — 93306 TTE W/DOPPLER COMPLETE: CPT

## 2022-11-30 PROCEDURE — 83735 ASSAY OF MAGNESIUM: CPT

## 2022-11-30 PROCEDURE — U0003: CPT

## 2022-11-30 PROCEDURE — 99285 EMERGENCY DEPT VISIT HI MDM: CPT

## 2022-11-30 PROCEDURE — 93312 ECHO TRANSESOPHAGEAL: CPT

## 2022-11-30 PROCEDURE — 83605 ASSAY OF LACTIC ACID: CPT

## 2022-11-30 PROCEDURE — 87536 HIV-1 QUANT&REVRSE TRNSCRPJ: CPT

## 2022-11-30 PROCEDURE — 81003 URINALYSIS AUTO W/O SCOPE: CPT

## 2022-11-30 PROCEDURE — 0042T: CPT

## 2022-11-30 PROCEDURE — 70498 CT ANGIOGRAPHY NECK: CPT

## 2022-11-30 PROCEDURE — 86900 BLOOD TYPING SEROLOGIC ABO: CPT

## 2022-11-30 RX ORDER — CLOPIDOGREL BISULFATE 75 MG/1
1 TABLET, FILM COATED ORAL
Qty: 30 | Refills: 0
Start: 2022-11-30 | End: 2022-12-29

## 2022-11-30 RX ORDER — ATORVASTATIN CALCIUM 80 MG/1
1 TABLET, FILM COATED ORAL
Qty: 30 | Refills: 0
Start: 2022-11-30 | End: 2022-12-29

## 2022-11-30 RX ORDER — ASPIRIN/CALCIUM CARB/MAGNESIUM 324 MG
1 TABLET ORAL
Qty: 30 | Refills: 0
Start: 2022-11-30 | End: 2022-12-29

## 2022-11-30 RX ADMIN — LISINOPRIL 5 MILLIGRAM(S): 2.5 TABLET ORAL at 06:07

## 2022-11-30 NOTE — PROGRESS NOTE ADULT - ATTENDING COMMENTS
The patient is a 62-year-old gentleman with a history of HIV, hypertension, and polysubstance use (heroin, cocaine) who is admitted after presenting with gait unsteadiness, right-sided weakness, and stuttering speech. MRI with + L BG acute stroke. TTE/DAVID unrevealing. Head CTA showed no significant findings. UDS + Cocaine. s/p ILR but cocaine use could have potentially been the cause of his stroke. For now, continue DAPT, statin ().

## 2022-11-30 NOTE — PROGRESS NOTE ADULT - REASON FOR ADMISSION
right sided weakness

## 2022-11-30 NOTE — PROGRESS NOTE ADULT - PROVIDER SPECIALTY LIST ADULT
Neurology
Electrophysiology
Hospitalist
Neurology
Hospitalist
Internal Medicine
Neurology
Neurology
Rehab Medicine
Internal Medicine

## 2022-11-30 NOTE — PROGRESS NOTE ADULT - TIME BILLING
review of chart documentation and data; interview/examination of patient; discussion of assessment/plan with patient, family, and multidisciplinary teams.
review of patient information including recent vital signs, labs, imaging, and notes; assessing, examining patient; updating patient/family; discussion and coordination of care with multidisciplinary team.
review of chart documentation and data; interview/examination of patient; discussion of assessment/plan with patient, family, and multidisciplinary teams.
review of chart documentation and data; interview/examination of patient; discussion of assessment/plan with patient, family, and multidisciplinary teams.
review of patient information including recent vital signs, labs, imaging, and notes; assessing, examining patient; updating patient/family; discussion and coordination of care with multidisciplinary team.
review of patient information including recent vital signs, labs, imaging, and notes; assessing, examining patient; updating patient/family; discussion and coordination of care with multidisciplinary team.

## 2022-11-30 NOTE — DISCHARGE NOTE NURSING/CASE MANAGEMENT/SOCIAL WORK - PATIENT PORTAL LINK FT
You can access the FollowMyHealth Patient Portal offered by [facility] by registering at the following website: [email or web address]. By joining [employer]’s FollowMyHealth portal, you will also be able to view your health information using other applications (apps) compatible with our system.

## 2022-12-06 DIAGNOSIS — F14.10 COCAINE ABUSE, UNCOMPLICATED: ICD-10-CM

## 2022-12-06 DIAGNOSIS — I10 ESSENTIAL (PRIMARY) HYPERTENSION: ICD-10-CM

## 2022-12-06 DIAGNOSIS — D72.819 DECREASED WHITE BLOOD CELL COUNT, UNSPECIFIED: ICD-10-CM

## 2022-12-06 DIAGNOSIS — K59.00 CONSTIPATION, UNSPECIFIED: ICD-10-CM

## 2022-12-06 DIAGNOSIS — B20 HUMAN IMMUNODEFICIENCY VIRUS [HIV] DISEASE: ICD-10-CM

## 2022-12-06 DIAGNOSIS — R29.703 NIHSS SCORE 3: ICD-10-CM

## 2022-12-06 DIAGNOSIS — Z87.891 PERSONAL HISTORY OF NICOTINE DEPENDENCE: ICD-10-CM

## 2022-12-06 DIAGNOSIS — I69.323 FLUENCY DISORDER FOLLOWING CEREBRAL INFARCTION: ICD-10-CM

## 2022-12-06 DIAGNOSIS — I63.9 CEREBRAL INFARCTION, UNSPECIFIED: ICD-10-CM

## 2022-12-06 DIAGNOSIS — E78.5 HYPERLIPIDEMIA, UNSPECIFIED: ICD-10-CM

## 2022-12-06 DIAGNOSIS — G81.91 HEMIPLEGIA, UNSPECIFIED AFFECTING RIGHT DOMINANT SIDE: ICD-10-CM

## 2023-01-25 ENCOUNTER — APPOINTMENT (OUTPATIENT)
Dept: NEUROLOGY | Facility: CLINIC | Age: 63
End: 2023-01-25

## 2023-02-08 ENCOUNTER — NON-APPOINTMENT (OUTPATIENT)
Age: 63
End: 2023-02-08

## 2023-02-08 ENCOUNTER — APPOINTMENT (OUTPATIENT)
Dept: HEART AND VASCULAR | Facility: CLINIC | Age: 63
End: 2023-02-08
Payer: COMMERCIAL

## 2023-02-08 PROCEDURE — G2066: CPT

## 2023-02-08 PROCEDURE — 93298 REM INTERROG DEV EVAL SCRMS: CPT

## 2023-03-15 ENCOUNTER — NON-APPOINTMENT (OUTPATIENT)
Age: 63
End: 2023-03-15

## 2023-03-15 ENCOUNTER — APPOINTMENT (OUTPATIENT)
Dept: HEART AND VASCULAR | Facility: CLINIC | Age: 63
End: 2023-03-15
Payer: COMMERCIAL

## 2023-03-15 PROCEDURE — 93298 REM INTERROG DEV EVAL SCRMS: CPT

## 2023-03-15 PROCEDURE — G2066: CPT

## 2023-04-10 NOTE — PHYSICAL THERAPY INITIAL EVALUATION ADULT - NAME OF CLINICIAN
Garcia is a 59 year old who is being evaluated via a billable video visit.      How would you like to obtain your AVS? Juanjohart  If the video visit is dropped, the invitation should be resent by: Send to e-mail at: [email or web address]  Will anyone else be joining your video visit? No          Assessment & Plan     Infection due to 2019 novel coronavirus  Improving at this time but needs a few more days due to nature of job; work letter updated and can complete official forms if/when needed. Return to clinic with any worsening or changes in symptoms and follow up with PCP for routine care.       Review of prior external note(s) from - previous routine PCP and acute notes  15 minutes spent by me on the date of the encounter doing chart review, history and exam, documentation and further activities per the note       There are no Patient Instructions on file for this visit.    Sherrie Stuart PA-C  [facility]   Garcia is a 59 year old, presenting for the following health issues:  Covid Concern        4/10/2023    11:43 AM   Additional Questions   Roomed by david cedillo   Accompanied by self     Forms 4/10/2023   Any forms needing to be completed Yes     History of Present Illness       Reason for visit:  Positive covid    He eats 2-3 servings of fruits and vegetables daily.He consumes 0 sweetened beverage(s) daily.He exercises with enough effort to increase his heart rate 60 or more minutes per day.  He exercises with enough effort to increase his heart rate 6 days per week.   He is taking medications regularly.         COVID-19 Symptom Review  How many days ago did these symptoms start? 04/04/2023 and tested positive 04/06/2023    Are any of the following symptoms significant for you?    New or worsening difficulty breathing? No    Worsening cough? No    Fever or chills? Yes, I had chills.    Headache: YES    Sore throat: YES    Chest pain: No    Diarrhea: No    Body aches?  "YES  Additional: feels fatigue, congested and dull headache  Patient has used albuterol maybe twice; takes Singulair and advair nightly.  Patient works as  and does not feel like he can do that yet.    What treatments has patient tried? Decongestant - oral   Does patient live in a nursing home, group home, or shelter? No  Does patient have a way to get food/medications during quarantined? Yes, I have a friend or family member who can help me.            Review of Systems   Constitutional, HEENT, cardiovascular, pulmonary, GI, , musculoskeletal, neuro, skin, endocrine and psych systems are negative, except as otherwise noted.      Objective    Vitals - Patient Reported  Weight (Patient Reported): 74.8 kg (165 lb)  Height (Patient Reported): 180.3 cm (5' 11\")  BMI (Based on Pt Reported Ht/Wt): 23.01        Physical Exam   GENERAL: Healthy, alert and no distress  EYES: Eyes grossly normal to inspection.  No discharge or erythema, or obvious scleral/conjunctival abnormalities.  RESP: No audible wheeze, cough, or visible cyanosis.  No visible retractions or increased work of breathing.    SKIN: Visible skin clear. No significant rash, abnormal pigmentation or lesions.  NEURO: Cranial nerves grossly intact.  Mentation and speech appropriate for age.  PSYCH: Mentation appears normal, affect normal/bright, judgement and insight intact, normal speech and appearance well-groomed.            Video-Visit Details    Type of service:  Video Visit     Originating Location (pt. Location): Home    Distant Location (provider location):  Off-site  Platform used for Video Visit: Paula    " AZUCENA Powell

## 2023-04-19 ENCOUNTER — NON-APPOINTMENT (OUTPATIENT)
Age: 63
End: 2023-04-19

## 2023-04-19 ENCOUNTER — APPOINTMENT (OUTPATIENT)
Dept: HEART AND VASCULAR | Facility: CLINIC | Age: 63
End: 2023-04-19
Payer: COMMERCIAL

## 2023-04-19 PROCEDURE — 93298 REM INTERROG DEV EVAL SCRMS: CPT

## 2023-04-19 PROCEDURE — G2066: CPT

## 2023-05-24 ENCOUNTER — APPOINTMENT (OUTPATIENT)
Dept: HEART AND VASCULAR | Facility: CLINIC | Age: 63
End: 2023-05-24
Payer: COMMERCIAL

## 2023-05-24 ENCOUNTER — NON-APPOINTMENT (OUTPATIENT)
Age: 63
End: 2023-05-24

## 2023-05-24 PROCEDURE — 93298 REM INTERROG DEV EVAL SCRMS: CPT

## 2023-05-24 PROCEDURE — G2066: CPT

## 2023-06-06 ENCOUNTER — NON-APPOINTMENT (OUTPATIENT)
Age: 63
End: 2023-06-06

## 2023-06-27 ENCOUNTER — APPOINTMENT (OUTPATIENT)
Dept: HEART AND VASCULAR | Facility: CLINIC | Age: 63
End: 2023-06-27
Payer: COMMERCIAL

## 2023-06-27 ENCOUNTER — NON-APPOINTMENT (OUTPATIENT)
Age: 63
End: 2023-06-27

## 2023-06-27 PROCEDURE — G2066: CPT

## 2023-06-27 PROCEDURE — 93298 REM INTERROG DEV EVAL SCRMS: CPT

## 2023-07-25 NOTE — DISCHARGE NOTE PROVIDER - NSDCDCMDCOMP_GEN_ALL_CORE
Bed: 07  Expected date:   Expected time:   Means of arrival:   Comments:  [place] [employer] This document is complete and the patient is ready for discharge.

## 2023-08-01 ENCOUNTER — APPOINTMENT (OUTPATIENT)
Dept: HEART AND VASCULAR | Facility: CLINIC | Age: 63
End: 2023-08-01
Payer: COMMERCIAL

## 2023-08-01 ENCOUNTER — NON-APPOINTMENT (OUTPATIENT)
Age: 63
End: 2023-08-01

## 2023-08-01 PROCEDURE — 93298 REM INTERROG DEV EVAL SCRMS: CPT

## 2023-08-01 PROCEDURE — G2066: CPT

## 2023-08-25 ENCOUNTER — APPOINTMENT (OUTPATIENT)
Dept: HEART AND VASCULAR | Facility: CLINIC | Age: 63
End: 2023-08-25
Payer: MEDICARE

## 2023-08-25 VITALS
BODY MASS INDEX: 28 KG/M2 | DIASTOLIC BLOOD PRESSURE: 67 MMHG | SYSTOLIC BLOOD PRESSURE: 121 MMHG | WEIGHT: 200 LBS | TEMPERATURE: 97.9 F | HEART RATE: 66 BPM | HEIGHT: 71 IN

## 2023-08-25 DIAGNOSIS — I63.9 CEREBRAL INFARCTION, UNSPECIFIED: ICD-10-CM

## 2023-08-25 PROCEDURE — 93291 INTERROG DEV EVAL SCRMS IP: CPT | Mod: 26

## 2023-08-25 NOTE — HISTORY OF PRESENT ILLNESS
[de-identified] : Patient with h/o cryptogenic stroke s/p ILR.  He has been transmitting to [employer].  No events.  He has been drug / EtOH and cigarette free since his stroke and is doing well.  He has no complaints.

## 2023-08-25 NOTE — DISCUSSION/SUMMARY
[FreeTextEntry1] : Remote Home Monitor team alert for episode of pause on 6/5/23 at 19:53 (6 sec), and one prior episode on 5/27/23 at 17:44 PM (5 secs)\par  Both episodes show slowing of the sinus rate first before pause, after which HR picked up again.  Not CHB.  No AFIB.  These appear to be vagally mediated. \par  ILR was implanted for cryptogenic stroke in 11/2022.  Pt was given f/u appt in 1/2023 with us but never showed up.  THe only contact phone number in chart [phone] -- no voicemail / answer. From [place] [phone], this number was noted and was called but no answer. Left VM for this number for him to call us back. \par  Will have office send him a letter for him to call us to book appt for f/u.

## 2023-09-26 ENCOUNTER — APPOINTMENT (OUTPATIENT)
Dept: HEART AND VASCULAR | Facility: CLINIC | Age: 63
End: 2023-09-26
Payer: MEDICARE

## 2023-09-26 ENCOUNTER — NON-APPOINTMENT (OUTPATIENT)
Age: 63
End: 2023-09-26

## 2023-09-26 PROCEDURE — G2066: CPT

## 2023-09-26 PROCEDURE — 93298 REM INTERROG DEV EVAL SCRMS: CPT

## 2023-10-31 ENCOUNTER — NON-APPOINTMENT (OUTPATIENT)
Age: 63
End: 2023-10-31

## 2023-10-31 ENCOUNTER — APPOINTMENT (OUTPATIENT)
Dept: HEART AND VASCULAR | Facility: CLINIC | Age: 63
End: 2023-10-31
Payer: MEDICARE

## 2023-11-01 PROCEDURE — 93298 REM INTERROG DEV EVAL SCRMS: CPT | Mod: NC

## 2023-11-01 PROCEDURE — G2066: CPT

## 2023-11-13 NOTE — PHYSICAL THERAPY INITIAL EVALUATION ADULT - GAIT DISTANCE, PT EVAL
Approving, but needs appt for additional refills.   
PT CALLED REQUESTING A REFILL OF ADDERALL TO THE [facility] IN [place]. HER NEXT APPT IS 1.8.24  
100ft x2

## 2023-12-05 ENCOUNTER — APPOINTMENT (OUTPATIENT)
Dept: HEART AND VASCULAR | Facility: CLINIC | Age: 63
End: 2023-12-05
Payer: MEDICARE

## 2023-12-05 ENCOUNTER — NON-APPOINTMENT (OUTPATIENT)
Age: 63
End: 2023-12-05

## 2023-12-06 PROCEDURE — G2066: CPT | Mod: NC

## 2023-12-06 PROCEDURE — 93298 REM INTERROG DEV EVAL SCRMS: CPT | Mod: NC

## 2024-01-09 ENCOUNTER — APPOINTMENT (OUTPATIENT)
Dept: HEART AND VASCULAR | Facility: CLINIC | Age: 64
End: 2024-01-09
Payer: MEDICARE

## 2024-01-09 ENCOUNTER — NON-APPOINTMENT (OUTPATIENT)
Age: 64
End: 2024-01-09

## 2024-01-10 PROCEDURE — 93298 REM INTERROG DEV EVAL SCRMS: CPT

## 2024-02-13 ENCOUNTER — APPOINTMENT (OUTPATIENT)
Dept: HEART AND VASCULAR | Facility: CLINIC | Age: 64
End: 2024-02-13
Payer: MEDICARE

## 2024-02-13 ENCOUNTER — NON-APPOINTMENT (OUTPATIENT)
Age: 64
End: 2024-02-13

## 2024-02-14 PROCEDURE — 93298 REM INTERROG DEV EVAL SCRMS: CPT

## 2024-03-19 ENCOUNTER — APPOINTMENT (OUTPATIENT)
Dept: HEART AND VASCULAR | Facility: CLINIC | Age: 64
End: 2024-03-19
Payer: MEDICARE

## 2024-03-19 ENCOUNTER — NON-APPOINTMENT (OUTPATIENT)
Age: 64
End: 2024-03-19

## 2024-03-19 PROCEDURE — 93298 REM INTERROG DEV EVAL SCRMS: CPT

## 2024-04-23 ENCOUNTER — APPOINTMENT (OUTPATIENT)
Dept: HEART AND VASCULAR | Facility: CLINIC | Age: 64
End: 2024-04-23
Payer: MEDICARE

## 2024-04-23 ENCOUNTER — NON-APPOINTMENT (OUTPATIENT)
Age: 64
End: 2024-04-23

## 2024-04-23 PROCEDURE — 93298 REM INTERROG DEV EVAL SCRMS: CPT

## 2024-05-28 ENCOUNTER — NON-APPOINTMENT (OUTPATIENT)
Age: 64
End: 2024-05-28

## 2024-05-28 ENCOUNTER — APPOINTMENT (OUTPATIENT)
Dept: HEART AND VASCULAR | Facility: CLINIC | Age: 64
End: 2024-05-28
Payer: MEDICARE

## 2024-05-28 PROCEDURE — 93298 REM INTERROG DEV EVAL SCRMS: CPT

## 2024-07-01 ENCOUNTER — APPOINTMENT (OUTPATIENT)
Dept: HEART AND VASCULAR | Facility: CLINIC | Age: 64
End: 2024-07-01

## 2024-07-01 PROCEDURE — 93298 REM INTERROG DEV EVAL SCRMS: CPT

## 2024-08-05 ENCOUNTER — APPOINTMENT (OUTPATIENT)
Dept: HEART AND VASCULAR | Facility: CLINIC | Age: 64
End: 2024-08-05

## 2024-08-05 ENCOUNTER — NON-APPOINTMENT (OUTPATIENT)
Age: 64
End: 2024-08-05

## 2024-08-05 PROCEDURE — 93298 REM INTERROG DEV EVAL SCRMS: CPT

## 2024-08-20 ENCOUNTER — APPOINTMENT (OUTPATIENT)
Dept: HEART AND VASCULAR | Facility: CLINIC | Age: 64
End: 2024-08-20

## 2024-09-20 ENCOUNTER — NON-APPOINTMENT (OUTPATIENT)
Age: 64
End: 2024-09-20

## 2024-09-20 ENCOUNTER — APPOINTMENT (OUTPATIENT)
Dept: HEART AND VASCULAR | Facility: CLINIC | Age: 64
End: 2024-09-20
Payer: MEDICARE

## 2024-09-20 PROCEDURE — 93298 REM INTERROG DEV EVAL SCRMS: CPT

## 2024-10-25 ENCOUNTER — NON-APPOINTMENT (OUTPATIENT)
Age: 64
End: 2024-10-25

## 2024-10-25 ENCOUNTER — APPOINTMENT (OUTPATIENT)
Dept: HEART AND VASCULAR | Facility: CLINIC | Age: 64
End: 2024-10-25
Payer: MEDICARE

## 2024-10-25 PROCEDURE — 93298 REM INTERROG DEV EVAL SCRMS: CPT

## 2024-11-29 ENCOUNTER — APPOINTMENT (OUTPATIENT)
Dept: HEART AND VASCULAR | Facility: CLINIC | Age: 64
End: 2024-11-29

## 2024-12-03 NOTE — ED ADULT TRIAGE NOTE - CHIEF COMPLAINT QUOTE
No
pt on disability, lives at home, 5 days ago had spontaneous right lower leg pain, denies injury, tried to elevate and use motrin to no relief, pt states "it feels tight", nil sob

## 2025-01-02 ENCOUNTER — APPOINTMENT (OUTPATIENT)
Dept: HEART AND VASCULAR | Facility: CLINIC | Age: 65
End: 2025-01-02
Payer: MEDICARE

## 2025-01-02 ENCOUNTER — NON-APPOINTMENT (OUTPATIENT)
Age: 65
End: 2025-01-02

## 2025-01-02 PROCEDURE — 93298 REM INTERROG DEV EVAL SCRMS: CPT

## 2025-02-06 ENCOUNTER — NON-APPOINTMENT (OUTPATIENT)
Age: 65
End: 2025-02-06

## 2025-02-06 ENCOUNTER — APPOINTMENT (OUTPATIENT)
Dept: HEART AND VASCULAR | Facility: CLINIC | Age: 65
End: 2025-02-06
Payer: MEDICARE

## 2025-02-06 PROCEDURE — 93298 REM INTERROG DEV EVAL SCRMS: CPT

## 2025-03-13 ENCOUNTER — NON-APPOINTMENT (OUTPATIENT)
Age: 65
End: 2025-03-13

## 2025-03-13 ENCOUNTER — APPOINTMENT (OUTPATIENT)
Dept: HEART AND VASCULAR | Facility: CLINIC | Age: 65
End: 2025-03-13
Payer: MEDICARE

## 2025-03-13 PROCEDURE — 93298 REM INTERROG DEV EVAL SCRMS: CPT

## 2025-04-17 ENCOUNTER — NON-APPOINTMENT (OUTPATIENT)
Age: 65
End: 2025-04-17

## 2025-04-17 ENCOUNTER — APPOINTMENT (OUTPATIENT)
Dept: HEART AND VASCULAR | Facility: CLINIC | Age: 65
End: 2025-04-17
Payer: MEDICARE

## 2025-04-17 PROCEDURE — 93298 REM INTERROG DEV EVAL SCRMS: CPT

## 2025-04-18 ENCOUNTER — TRANSCRIPTION ENCOUNTER (OUTPATIENT)
Age: 65
End: 2025-04-18

## 2025-05-19 ENCOUNTER — NON-APPOINTMENT (OUTPATIENT)
Age: 65
End: 2025-05-19

## 2025-05-19 ENCOUNTER — APPOINTMENT (OUTPATIENT)
Dept: HEART AND VASCULAR | Facility: CLINIC | Age: 65
End: 2025-05-19
Payer: MEDICARE

## 2025-05-19 PROCEDURE — 93298 REM INTERROG DEV EVAL SCRMS: CPT

## 2025-06-23 ENCOUNTER — APPOINTMENT (OUTPATIENT)
Dept: HEART AND VASCULAR | Facility: CLINIC | Age: 65
End: 2025-06-23
Payer: MEDICARE

## 2025-06-23 ENCOUNTER — NON-APPOINTMENT (OUTPATIENT)
Age: 65
End: 2025-06-23

## 2025-06-23 PROCEDURE — 93298 REM INTERROG DEV EVAL SCRMS: CPT

## 2025-07-28 ENCOUNTER — APPOINTMENT (OUTPATIENT)
Dept: HEART AND VASCULAR | Facility: CLINIC | Age: 65
End: 2025-07-28
Payer: MEDICARE

## 2025-07-28 ENCOUNTER — NON-APPOINTMENT (OUTPATIENT)
Age: 65
End: 2025-07-28

## 2025-07-28 PROCEDURE — 93298 REM INTERROG DEV EVAL SCRMS: CPT

## 2025-08-28 ENCOUNTER — APPOINTMENT (OUTPATIENT)
Dept: HEART AND VASCULAR | Facility: CLINIC | Age: 65
End: 2025-08-28
Payer: MEDICARE

## 2025-08-28 ENCOUNTER — NON-APPOINTMENT (OUTPATIENT)
Age: 65
End: 2025-08-28

## 2025-08-28 PROCEDURE — 93298 REM INTERROG DEV EVAL SCRMS: CPT
